# Patient Record
Sex: FEMALE | Race: WHITE | Employment: OTHER | URBAN - METROPOLITAN AREA
[De-identification: names, ages, dates, MRNs, and addresses within clinical notes are randomized per-mention and may not be internally consistent; named-entity substitution may affect disease eponyms.]

---

## 2017-07-14 ENCOUNTER — OFFICE VISIT (OUTPATIENT)
Dept: FAMILY MEDICINE | Facility: CLINIC | Age: 27
End: 2017-07-14
Payer: COMMERCIAL

## 2017-07-14 VITALS
BODY MASS INDEX: 53.62 KG/M2 | HEIGHT: 62 IN | TEMPERATURE: 99.3 F | RESPIRATION RATE: 25 BRPM | WEIGHT: 291.4 LBS | OXYGEN SATURATION: 99 % | DIASTOLIC BLOOD PRESSURE: 60 MMHG | HEART RATE: 103 BPM | SYSTOLIC BLOOD PRESSURE: 136 MMHG

## 2017-07-14 DIAGNOSIS — H66.001 ACUTE SUPPURATIVE OTITIS MEDIA OF RIGHT EAR WITHOUT SPONTANEOUS RUPTURE OF TYMPANIC MEMBRANE, RECURRENCE NOT SPECIFIED: Primary | ICD-10-CM

## 2017-07-14 PROCEDURE — 99213 OFFICE O/P EST LOW 20 MIN: CPT | Performed by: PHYSICIAN ASSISTANT

## 2017-07-14 RX ORDER — AZITHROMYCIN 250 MG/1
TABLET, FILM COATED ORAL
Qty: 6 TABLET | Refills: 0 | Status: SHIPPED | OUTPATIENT
Start: 2017-07-14 | End: 2018-01-15

## 2017-07-14 ASSESSMENT — PAIN SCALES - GENERAL: PAINLEVEL: MODERATE PAIN (4)

## 2017-07-14 NOTE — NURSING NOTE
"Chief Complaint   Patient presents with     Ear Problem     cough and vomitted 1/2 hr ago (sons are both ill )        Initial /60 (BP Location: Right arm, Patient Position: Chair, Cuff Size: Adult Large)  Pulse 103  Temp 99.3  F (37.4  C) (Oral)  Resp 25  Ht 5' 2\" (1.575 m)  Wt 291 lb 6.4 oz (132.2 kg)  SpO2 99%  BMI 53.3 kg/m2 Estimated body mass index is 53.3 kg/(m^2) as calculated from the following:    Height as of this encounter: 5' 2\" (1.575 m).    Weight as of this encounter: 291 lb 6.4 oz (132.2 kg).  Medication Reconciliation: complete   Frida Patton CMA      "

## 2017-07-14 NOTE — PROGRESS NOTES
"  SUBJECTIVE:  Keysha MILLAN is a 26 year old female who presents with the following concerns;              Symptoms: cc Present Absent Comment   Fever/Chills  x  Chills and achy    Fatigue  x     Muscle Aches  x     Eye Irritation  x  Weepy    Sneezing  x     Nasal Nicola/Drg  x     Sinus Pressure/Pain  x     Loss of smell  x     Dental pain   x    Sore Throat  x  slight   Swollen Glands   x Not sure   Ear Pain/Fullness  x  Both but Right is worse with painand crackling, son has b.l AOM   Cough  x     Wheeze   x    Chest Pain  x  With cough   Shortness of breath   x    Rash   x    Other  x  Vomiting x 1 yesterday, nausea with severe coughing     Symptom duration:  2 days   Sympom severity:  mod   Treatments tried:  dayquil and nyquil, tylenol    Contacts:  sons         No vertigo      No Known Allergies    No past medical history on file.      Current Outpatient Prescriptions on File Prior to Visit:  meclizine (ANTIVERT) 25 MG tablet Take 1 tablet (25 mg) by mouth every 4 hours as needed for dizziness (Patient not taking: Reported on 7/14/2017)   guaiFENesin-codeine (ROBITUSSIN AC) 100-10 MG/5ML SOLN Take 10 mLs by mouth every 6 hours as needed. (Patient not taking: Reported on 7/14/2017)   Norgestim-Eth Estrad Triphasic (TRI-SPRINTEC PO) Take  by mouth.     No current facility-administered medications on file prior to visit.     Social History   Substance Use Topics     Smoking status: Never Smoker     Smokeless tobacco: Not on file     Alcohol use Yes      Comment: [place]       ROS:  Consitutional: As above  ENT: As above  Respiratory: As above    OBJECTIVE:  /60 (BP Location: Right arm, Patient Position: Chair, Cuff Size: Adult Large)  Pulse 103  Temp 99.3  F (37.4  C) (Oral)  Resp 25  Ht 5' 2\" (1.575 m)  Wt 291 lb 6.4 oz (132.2 kg)  SpO2 99%  BMI 53.3 kg/m2  GENERAL APPEARANCE: healthy, alert and no distress  EYES: conjunctiva clear  HENT:  ,  Left TMsw/o erythema, effusion or bulging.rt TM red and " bulging.   Nose and mouth without ulcers, erythema or lesions.  NO tonsillar enlargement erythema or exudates.   NECK: supple, nontender, no lymphadenopathy  RESP: lungs clear to auscultation - no rales, rhonchi or wheezes  CV: regular rates and rhythm  abd soft nonttp, 1+ bowel sounds        ASSESSMENT: Well appearing.    ICD-10-CM    1. Acute suppurative otitis media of right ear without spontaneous rupture of tympanic membrane, recurrence not specified H66.001 azithromycin (ZITHROMAX Z-CHANCE) 250 MG tablet         PLAN:  Lots of rest and fluids.  RTC if any worsening symptoms or if not improving.    Tyshawn Bruce PA-C

## 2017-07-14 NOTE — PATIENT INSTRUCTIONS
Otitis Media (Middle-Ear Infection) in Adults  Otitis media is another name for a middle-ear infection. It means an infection behind your eardrum. This kind of ear infection can happen after any condition that keeps fluid from draining from the middle ear. These conditions include allergies, a cold, a sore throat, or a respiratory infection.  Middle-ear infections are common in children, but they can also happen in adults. An ear infection in an adult may mean a more serious problem than in a child. So you may need additional tests. If you have an ear infection, you should see your health care provider for treatment.  What are the types of middle-ear infections?  Infections can affect the middle ear in several ways. They are:    Acute otitis media. This middle-ear infection occurs suddenly. It causes swelling and redness. Fluid and mucus become trapped inside the ear. You can have a fever and ear pain.    Otitis media with effusion. Fluid (effusion) and mucus build up in the middle ear after the infection goes away. You may feel like your middle ear is full. This can continue for months and may affect your hearing.    Chronic otitis media with effusion. Fluid (effusion) remains in the middle ear for a long time. Or it builds up again and again, even though there is no infection. This type of middle-ear infection may be hard to treat. It may also affect your hearing.  Who is more likely to get a middle-ear infection?  You are more likely to get an ear infection if you:    Smoke or are around someone who smokes    Have seasonal or year-round allergy symptoms    Have a cold or other upper respiratory infection  What causes a middle-ear infection?  The middle ear connects to the throat by a canal called the eustachian tube. This tube helps even out the pressure between the outer ear and the inner ear. A cold or allergy can irritate the tube or cause the area around it to swell. This can keep fluid from draining from  the middle ear. The fluid builds up behind the eardrum. Bacteria and viruses can grow in this fluid. The bacteria and viruses cause the middle-ear infection.  What are the symptoms of a middle-ear infection?  Common symptoms of a middle-ear infection in adults are:    Pain in 1 or both ears    Drainage from the ear    Muffled hearing    Sore throat   You may also have a fever. Rarely, your balance can be affected.  These symptoms may be the same as for other conditions. It s important to talk with your health care provider if you think you have a middle-ear infection. If you have a high fever, severe pain behind your ear, or paralysis in your face, see your provider as soon as you can.  How is a middle-ear infection diagnosed?  Your health care provider will take a medical history and do a physical exam. He or she will look at the outer ear and eardrum with an otoscope. The otoscope is a lighted tool that lets your provider see inside the ear. A pneumatic otoscope blows a puff of air into the ear to check how well your eardrum moves. If you eardrum doesn t move well, it may mean you have fluid behind it.  Your provider may also do a test called tympanometry. This test tells how well the middle ear is working. It can find any changes in pressure in the middle ear. Your provider may test your hearing with a tuning fork.  How is a middle-ear infection treated?  A middle-ear infection may be treated with:    Antibiotics, taken by mouth or as ear drops    Medication for pain    Decongestants, antihistamines, or nasal steroids  Your health care provider may also have you try autoinsufflation. This helps adjust the air pressure in your ear. For this, you pinch your nose and gently exhale. This forces air back through the eustachian tube.  The exact treatment for your ear infection will depend on the type of infection you have. In general, if your symptoms don t get better in 48 to 72 hours, contact your health care  provider.  Middle-ear infections can cause long-term problems if not treated. They can lead to:    Infection in other parts of the head    Permanent hearing loss    Paralysis of a nerve in your face  If you have a middle-ear infection that doesn t get better, you may need to see an ear, nose, and throat specialist (otolaryngologist). You may need a CT scan or MRI to check for head and neck cancer.  Ear tubes  Sometimes fluid stays in the middle ear even after you take antibiotics and the infection goes away. In this case, your health care provider may suggest that a small tube be placed in your ear. The tube is put at the opening of the eardrum. The tube keeps fluid from building up and relieves pressure in the middle ear. It can also help you hear better. This surgery is called myringotomy. It is not often done in adults.  The tubes usually fall out on their own after 6 months to a year.    0959-5485 [employer]. [address] [place] [address], [place] [address]. All rights reserved. This information is not intended as a substitute for professional medical care. Always follow your healthcare professional's instructions.

## 2017-07-14 NOTE — MR AVS SNAPSHOT
After Visit Summary   7/14/2017    Keysha MILLAN    MRN: [id number]           Patient Information     Date Of Birth          1990        Visit Information        Provider Department      7/14/2017 1:20 PM Byron Bruce PA [facility]        Today's Diagnoses     Acute suppurative otitis media of right ear without spontaneous rupture of tympanic membrane, recurrence not specified    -  1      Care Instructions      Otitis Media (Middle-Ear Infection) in Adults  Otitis media is another name for a middle-ear infection. It means an infection behind your eardrum. This kind of ear infection can happen after any condition that keeps fluid from draining from the middle ear. These conditions include allergies, a cold, a sore throat, or a respiratory infection.  Middle-ear infections are common in children, but they can also happen in adults. An ear infection in an adult may mean a more serious problem than in a child. So you may need additional tests. If you have an ear infection, you should see your health care provider for treatment.  What are the types of middle-ear infections?  Infections can affect the middle ear in several ways. They are:    Acute otitis media. This middle-ear infection occurs suddenly. It causes swelling and redness. Fluid and mucus become trapped inside the ear. You can have a fever and ear pain.    Otitis media with effusion. Fluid (effusion) and mucus build up in the middle ear after the infection goes away. You may feel like your middle ear is full. This can continue for months and may affect your hearing.    Chronic otitis media with effusion. Fluid (effusion) remains in the middle ear for a long time. Or it builds up again and again, even though there is no infection. This type of middle-ear infection may be hard to treat. It may also affect your hearing.  Who is more likely to get a middle-ear infection?  You are more likely to get an ear  infection if you:    Smoke or are around someone who smokes    Have seasonal or year-round allergy symptoms    Have a cold or other upper respiratory infection  What causes a middle-ear infection?  The middle ear connects to the throat by a canal called the eustachian tube. This tube helps even out the pressure between the outer ear and the inner ear. A cold or allergy can irritate the tube or cause the area around it to swell. This can keep fluid from draining from the middle ear. The fluid builds up behind the eardrum. Bacteria and viruses can grow in this fluid. The bacteria and viruses cause the middle-ear infection.  What are the symptoms of a middle-ear infection?  Common symptoms of a middle-ear infection in adults are:    Pain in 1 or both ears    Drainage from the ear    Muffled hearing    Sore throat   You may also have a fever. Rarely, your balance can be affected.  These symptoms may be the same as for other conditions. It s important to talk with your health care provider if you think you have a middle-ear infection. If you have a high fever, severe pain behind your ear, or paralysis in your face, see your provider as soon as you can.  How is a middle-ear infection diagnosed?  Your health care provider will take a medical history and do a physical exam. He or she will look at the outer ear and eardrum with an otoscope. The otoscope is a lighted tool that lets your provider see inside the ear. A pneumatic otoscope blows a puff of air into the ear to check how well your eardrum moves. If you eardrum doesn t move well, it may mean you have fluid behind it.  Your provider may also do a test called tympanometry. This test tells how well the middle ear is working. It can find any changes in pressure in the middle ear. Your provider may test your hearing with a tuning fork.  How is a middle-ear infection treated?  A middle-ear infection may be treated with:    Antibiotics, taken by mouth or as ear  drops    Medication for pain    Decongestants, antihistamines, or nasal steroids  Your health care provider may also have you try autoinsufflation. This helps adjust the air pressure in your ear. For this, you pinch your nose and gently exhale. This forces air back through the eustachian tube.  The exact treatment for your ear infection will depend on the type of infection you have. In general, if your symptoms don t get better in 48 to 72 hours, contact your health care provider.  Middle-ear infections can cause long-term problems if not treated. They can lead to:    Infection in other parts of the head    Permanent hearing loss    Paralysis of a nerve in your face  If you have a middle-ear infection that doesn t get better, you may need to see an ear, nose, and throat specialist (otolaryngologist). You may need a CT scan or MRI to check for head and neck cancer.  Ear tubes  Sometimes fluid stays in the middle ear even after you take antibiotics and the infection goes away. In this case, your health care provider may suggest that a small tube be placed in your ear. The tube is put at the opening of the eardrum. The tube keeps fluid from building up and relieves pressure in the middle ear. It can also help you hear better. This surgery is called myringotomy. It is not often done in adults.  The tubes usually fall out on their own after 6 months to a year.    [id number] [employer]. [address] [place] [address]. All rights reserved. This information is not intended as a substitute for professional medical care. Always follow your healthcare professional's instructions.                Follow-ups after your visit        Follow-up notes from your care team     Return if symptoms worsen or fail to improve.      Who to contact     If you have questions or need follow up information about today's clinic visit or your schedule please contact [facility] LIVIA CRAVEN directly at  "[phone].  Normal or non-critical lab and imaging results will be communicated to you by ValuNethart, letter or phone within 4 business days after the clinic has received the results. If you do not hear from us within 7 days, please contact the clinic through [employer]uNethart or phone. If you have a critical or abnormal lab result, we will notify you by phone as soon as possible.  Submit refill requests through Hubblr or call your pharmacy and they will forward the refill request to us. Please allow 3 business days for your refill to be completed.          Additional Information About Your Visit        ValuNetharICEdot Information     Hubblr lets you send messages to your doctor, view your test results, renew your prescriptions, schedule appointments and more. To sign up, go to [email or web address] . Click on \"Log in\" on the left side of the screen, which will take you to the Welcome page. Then click on \"Sign up Now\" on the right side of the page.     You will be asked to enter the access code listed below, as well as some personal information. Please follow the directions to create your username and password.     Your access code is: [email or web address]  Expires: 10/12/2017  1:32 PM     Your access code will  in 90 days. If you need help or a new code, please call your [facility] clinic or [phone].        Care EveryWhere ID     This is your Care EveryWhere ID. This could be used by other organizations to access your [facility] medical records  [id number]        Your Vitals Were     Pulse Temperature Respirations Height Pulse Oximetry BMI (Body Mass Index)    103 99.3  F (37.4  C) (Oral) 25 5' 2\" (1.575 m) 99% 53.3 kg/m2       Blood Pressure from Last 3 Encounters:   17 136/60   16 135/75   10/04/12 154/96    Weight from Last 3 Encounters:   17 291 lb 6.4 oz (132.2 kg)   16 260 lb (117.9 kg)   10/04/12 263 lb (119.3 kg)              Today, you had the following     No orders found for display       "   Today's Medication Changes          These changes are accurate as of: 7/14/17  1:32 PM.  If you have any questions, ask your nurse or doctor.               Start taking these medicines.        Dose/Directions    azithromycin 250 MG tablet   Commonly known as:  ZITHROMAX Z-CHANCE   Used for:  Acute suppurative otitis media of right ear without spontaneous rupture of tympanic membrane, recurrence not specified   Started by:  Byron Bruce PA        2 tabs day one then 1 tab qd   Quantity:  6 tablet   Refills:  0            Where to get your medicines      These medications were sent to [id number] - [place], [place] - [address]  [address] [address]     Phone:  [phone]     azithromycin 250 MG tablet                Primary Care Provider    Physician No Ref-Primary       No address on file        Equal Access to Services     CHHAYA RAINES : Jd lyleo Somiguel, waaxda luqadaha, qaybta kaalmada adeegyada, dar torres. So [facility] [phone].    ATENCIÓN: Si habla español, tiene a hughes disposición servicios gratuitos de asistencia lingüística. Llame al [phone].    We comply with applicable federal civil rights laws and [place] laws. We do not discriminate on the basis of race, color, national origin, age, disability sex, sexual orientation or gender identity.            Thank you!     Thank you for choosing [facility]  for your care. Our goal is always to provide you with excellent care. Hearing back from our patients is one way we can continue to improve our services. Please take a few minutes to complete the written survey that you may receive in the mail after your visit with us. Thank you!             Your Updated Medication List - Protect others around you: Learn how to safely use, store and throw away your medicines at www.disposemymeds.org.          This list is accurate as of: 7/14/17  1:32 PM.   Always use your most recent med list.                   Brand Name Dispense Instructions for use Diagnosis    azithromycin 250 MG tablet    ZITHROMAX Z-CHANCE    6 tablet    2 tabs day one then 1 tab qd    Acute suppurative otitis media of right ear without spontaneous rupture of tympanic membrane, recurrence not specified       guaiFENesin-codeine 100-10 MG/5ML Soln solution    ROBITUSSIN AC    200 mL    Take 10 mLs by mouth every 6 hours as needed.    Acute pharyngitis       meclizine 25 MG tablet    ANTIVERT    30 tablet    Take 1 tablet (25 mg) by mouth every 4 hours as needed for dizziness        TRI-SPRINTEC PO      Take  by mouth.

## 2017-08-14 ENCOUNTER — TELEPHONE (OUTPATIENT)
Dept: FAMILY MEDICINE | Facility: CLINIC | Age: 27
End: 2017-08-14

## 2017-08-14 NOTE — TELEPHONE ENCOUNTER
Panel Management Review      BP Readings from Last 1 Encounters:   07/14/17 136/60    , No results found for: A1C, 7/14/2017    Fail List measure: Physical with pap.      Patient is due/failing the following:   PAP and PHYSICAL    Action needed:   Patient needs office visit for Physical with pap..    Type of outreach:    Phone, left message for patient to call back.  and Sent letter.    Questions for provider review:    None                                                                                                                                    Luz Elena Arteaga CMA

## 2017-08-14 NOTE — LETTER
August 14, 2017      Keysha MILLAN  [address] [address]        Dear Keysha MILLAN, [id number]    At [facility] we care about your health and are committed to providing quality patient care, which includes staying current on preventative cancer screenings.  You can increase your chances of finding and treating cancers through regular screenings.      Our records show that you are due for the following screening(s): Physical with pap.    Pap Smear for cervical cancer  Recommended every three years for women 21 and older  A Pap test is used to detect cervical cancer.  The test should be taken at least once every three years but women who are at a greater risk for cervical cancer may need to have the test more often.      You are at a greater risk for cervical cancer if:   - You have had a sexually transmitted disease   - You have had more than one sex partner   - You have had an abnormal pap test in the past    You may contact the [facility] at [phone] to schedule the screening test(s) at your earliest convenience.    If you have a My-Chart Account, you also can schedule this appointment through there.    If you have already had one or all of the above screening tests at another facility, please call us so that we may update your chart.      Your partners in health,      Quality Committee   [facility]

## 2017-09-18 ENCOUNTER — OFFICE VISIT (OUTPATIENT)
Dept: URGENT CARE | Facility: URGENT CARE | Age: 27
End: 2017-09-18
Payer: COMMERCIAL

## 2017-09-18 VITALS
DIASTOLIC BLOOD PRESSURE: 93 MMHG | BODY MASS INDEX: 54.03 KG/M2 | HEART RATE: 114 BPM | WEIGHT: 293 LBS | SYSTOLIC BLOOD PRESSURE: 139 MMHG | TEMPERATURE: 98.9 F | OXYGEN SATURATION: 96 %

## 2017-09-18 DIAGNOSIS — R07.0 THROAT PAIN: Primary | ICD-10-CM

## 2017-09-18 DIAGNOSIS — D72.829 LEUKOCYTOSIS, UNSPECIFIED TYPE: ICD-10-CM

## 2017-09-18 LAB
BASOPHILS # BLD AUTO: 0.1 10E9/L (ref 0–0.2)
BASOPHILS NFR BLD AUTO: 0.4 %
DEPRECATED S PYO AG THROAT QL EIA: NORMAL
DIFFERENTIAL METHOD BLD: ABNORMAL
EOSINOPHIL # BLD AUTO: 0.3 10E9/L (ref 0–0.7)
EOSINOPHIL NFR BLD AUTO: 2.1 %
ERYTHROCYTE [DISTWIDTH] IN BLOOD BY AUTOMATED COUNT: 17.4 % (ref 10–15)
HCT VFR BLD AUTO: 38.3 % (ref 35–47)
HETEROPH AB SER QL: NEGATIVE
HGB BLD-MCNC: 12.3 G/DL (ref 11.7–15.7)
LYMPHOCYTES # BLD AUTO: 2.8 10E9/L (ref 0.8–5.3)
LYMPHOCYTES NFR BLD AUTO: 23.5 %
MCH RBC QN AUTO: 25.5 PG (ref 26.5–33)
MCHC RBC AUTO-ENTMCNC: 32.1 G/DL (ref 31.5–36.5)
MCV RBC AUTO: 80 FL (ref 78–100)
MONOCYTES # BLD AUTO: 0.7 10E9/L (ref 0–1.3)
MONOCYTES NFR BLD AUTO: 6.1 %
NEUTROPHILS # BLD AUTO: 8 10E9/L (ref 1.6–8.3)
NEUTROPHILS NFR BLD AUTO: 67.9 %
PLATELET # BLD AUTO: 425 10E9/L (ref 150–450)
RBC # BLD AUTO: 4.82 10E12/L (ref 3.8–5.2)
SPECIMEN SOURCE: NORMAL
WBC # BLD AUTO: 11.8 10E9/L (ref 4–11)

## 2017-09-18 PROCEDURE — 87880 STREP A ASSAY W/OPTIC: CPT | Performed by: PHYSICIAN ASSISTANT

## 2017-09-18 PROCEDURE — 99213 OFFICE O/P EST LOW 20 MIN: CPT | Performed by: PHYSICIAN ASSISTANT

## 2017-09-18 PROCEDURE — 86308 HETEROPHILE ANTIBODY SCREEN: CPT | Performed by: PHYSICIAN ASSISTANT

## 2017-09-18 PROCEDURE — 36415 COLL VENOUS BLD VENIPUNCTURE: CPT | Performed by: PHYSICIAN ASSISTANT

## 2017-09-18 PROCEDURE — 85025 COMPLETE CBC W/AUTO DIFF WBC: CPT | Performed by: PHYSICIAN ASSISTANT

## 2017-09-18 PROCEDURE — 87081 CULTURE SCREEN ONLY: CPT | Performed by: PHYSICIAN ASSISTANT

## 2017-09-18 RX ORDER — CEFUROXIME AXETIL 500 MG/1
500 TABLET ORAL 2 TIMES DAILY
Qty: 20 TABLET | Refills: 0 | Status: SHIPPED | OUTPATIENT
Start: 2017-09-18 | End: 2018-01-15

## 2017-09-18 NOTE — MR AVS SNAPSHOT
"              After Visit Summary   2017    Keysha MILLAN    MRN: [id number]           Patient Information     Date Of Birth          1990        Visit Information        Provider Department      2017 11:20 AM Yas Merritt PA-C [facility]        Today's Diagnoses     Throat pain    -  1    Leukocytosis, unspecified type           Follow-ups after your visit        Who to contact     If you have questions or need follow up information about today's clinic visit or your schedule please contact [facility] directly at [phone].  Normal or non-critical lab and imaging results will be communicated to you by Seenhart, letter or phone within 4 business days after the clinic has received the results. If you do not hear from us within 7 days, please contact the clinic through Seenhart or phone. If you have a critical or abnormal lab result, we will notify you by phone as soon as possible.  Submit refill requests through Corporate Times or call your pharmacy and they will forward the refill request to us. Please allow 3 business days for your refill to be completed.          Additional Information About Your Visit        MyChart Information     [employer]porate Times lets you send messages to your doctor, view your test results, renew your prescriptions, schedule appointments and more. To sign up, go to [email or web address] . Click on \"Log in\" on the left side of the screen, which will take you to the Welcome page. Then click on \"Sign up Now\" on the right side of the page.     You will be asked to enter the access code listed below, as well as some personal information. Please follow the directions to create your username and password.     Your access code is: [email or web address]  Expires: 10/12/2017  1:32 PM     Your access code will  in 90 days. If you need help or a new code, please call your [facility] or [phone].        Care EveryWhere ID     This is your Care EveryWhere " ID. This could be used by other organizations to access your [place] medical records  [id number]        Your Vitals Were     Pulse Temperature Pulse Oximetry BMI (Body Mass Index)          114 98.9  F (37.2  C) (Oral) 96% 54.03 kg/m2         Blood Pressure from Last 3 Encounters:   09/18/17 (!) 139/93   07/14/17 136/60   09/19/16 135/75    Weight from Last 3 Encounters:   09/18/17 295 lb 6.4 oz (134 kg)   07/14/17 291 lb 6.4 oz (132.2 kg)   09/19/16 260 lb (117.9 kg)              We Performed the Following     Beta strep group A culture     CBC with platelets differential     Mononucleosis screen     Strep, Rapid Screen          Today's Medication Changes          These changes are accurate as of: 9/18/17  1:36 PM.  If you have any questions, ask your nurse or doctor.               Start taking these medicines.        Dose/Directions    cefuroxime 500 MG tablet   Commonly known as:  CEFTIN   Used for:  Throat pain, Leukocytosis, unspecified type   Started by:  Yas Merritt PA-C        Dose:  500 mg   Take 1 tablet (500 mg) by mouth 2 times daily   Quantity:  20 tablet   Refills:  0            Where to get your medicines      These medications were sent to [id number] [address] [address]     Phone:  [phone]     cefuroxime 500 MG tablet                Primary Care Provider    Physician No Ref-Primary       No address on file        Equal Access to Services     CHHAYA RAINES AH: Jd Alarcon, deon cuevas, qachito kaalmadar molina. So [facility] [phone].    ATENCIÓN: Si habla español, tiene a hughes disposición servicios gratuitos de asistencia lingüística. Llame al [phone].    We comply with applicable federal civil rights laws and [place] laws. We do not discriminate on the basis of race, color, national origin, age, disability sex, sexual orientation or gender  identity.            Thank you!     Thank you for choosing [facility]  for your care. Our goal is always to provide you with excellent care. Hearing back from our patients is one way we can continue to improve our services. Please take a few minutes to complete the written survey that you may receive in the mail after your visit with us. Thank you!             Your Updated Medication List - Protect others around you: Learn how to safely use, store and throw away your medicines at www.disposemymeds.org.          This list is accurate as of: 9/18/17  1:36 PM.  Always use your most recent med list.                   Brand Name Dispense Instructions for use Diagnosis    azithromycin 250 MG tablet    ZITHROMAX Z-CHANCE    6 tablet    2 tabs day one then 1 tab qd    Acute suppurative otitis media of right ear without spontaneous rupture of tympanic membrane, recurrence not specified       cefuroxime 500 MG tablet    CEFTIN    20 tablet    Take 1 tablet (500 mg) by mouth 2 times daily    Throat pain, Leukocytosis, unspecified type       guaiFENesin-codeine 100-10 MG/5ML Soln solution    ROBITUSSIN AC    200 mL    Take 10 mLs by mouth every 6 hours as needed.    Acute pharyngitis       meclizine 25 MG tablet    ANTIVERT    30 tablet    Take 1 tablet (25 mg) by mouth every 4 hours as needed for dizziness        TRI-SPRINTEC PO      Take  by mouth.

## 2017-09-18 NOTE — NURSING NOTE
"Chief Complaint   Patient presents with     Pharyngitis       Initial BP (!) 139/93 (BP Location: Left arm, Patient Position: Chair, Cuff Size: Adult Regular)  Pulse 114  Temp 98.9  F (37.2  C) (Oral)  Wt 295 lb 6.4 oz (134 kg)  SpO2 96%  BMI 54.03 kg/m2 Estimated body mass index is 54.03 kg/(m^2) as calculated from the following:    Height as of 7/14/17: 5' 2\" (1.575 m).    Weight as of this encounter: 295 lb 6.4 oz (134 kg).  Medication Reconciliation: complete       Kim Barnes    "

## 2017-09-18 NOTE — PROGRESS NOTES
SUBJECTIVE:   Keysha MILLAN is a 26 year old female who presents to clinic today for the following health issues:      Sore throat      Duration: 2 weeks    Description (location/character/radiation): throat    Intensity:  moderate    Accompanying signs and symptoms: left ear pain    History (similar episodes/previous evaluation): None    Precipitating or alleviating factors: None    Therapies tried and outcome: over the counter medicine     ST worse last couple of days. No reflux symptoms    No Known Allergies    No past medical history on file.      Current Outpatient Prescriptions on File Prior to Visit:  azithromycin (ZITHROMAX Z-CHANCE) 250 MG tablet 2 tabs day one then 1 tab qd   meclizine (ANTIVERT) 25 MG tablet Take 1 tablet (25 mg) by mouth every 4 hours as needed for dizziness (Patient not taking: Reported on 7/14/2017)   guaiFENesin-codeine (ROBITUSSIN AC) 100-10 MG/5ML SOLN Take 10 mLs by mouth every 6 hours as needed. (Patient not taking: Reported on 7/14/2017)   Norgestim-Eth Estrad Triphasic (TRI-SPRINTEC PO) Take  by mouth.     No current facility-administered medications on file prior to visit.     Social History   Substance Use Topics     Smoking status: Never Smoker     Smokeless tobacco: Not on file     Alcohol use Yes      Comment: [place]       ROS:  Consitutional: As above  ENT: As above  Respiratory: As above    OBJECTIVE:  BP (!) 139/93 (BP Location: Left arm, Patient Position: Chair, Cuff Size: Adult Regular)  Pulse 114  Temp 98.9  F (37.2  C) (Oral)  Wt 295 lb 6.4 oz (134 kg)  SpO2 96%  BMI 54.03 kg/m2  GENERAL APPEARANCE: healthy, alert and no distress  EYES: conjunctiva clear  EARS: No cerumen.   Ear canals w/o erythema, TM's intact w/o erythema.    NOSE/MOUTH: Nose and mouth without ulcers, erythema or lesions  SINUSES: No maxillary sinus tenderness.  THROAT: Moderate erythema w/o tonsillar enlargement . No exudates  NECK: supple, nontender, no lymphadenopathy  RESP: lungs clear to  auscultation - no rales, rhonchi or wheezes  CV: regular rates and rhythm, normal S1 S2, no murmur noted  NEURO: awake, alert    Results for orders placed or performed in visit on 09/18/17   Mononucleosis screen   Result Value Ref Range    Mononucleosis Screen Negative NEG^Negative   CBC with platelets differential   Result Value Ref Range    WBC 11.8 (H) 4.0 - 11.0 10e9/L    RBC Count 4.82 3.8 - 5.2 10e12/L    Hemoglobin 12.3 11.7 - 15.7 g/dL    Hematocrit 38.3 35.0 - 47.0 %    MCV 80 78 - 100 fl    MCH 25.5 (L) 26.5 - 33.0 pg    MCHC 32.1 31.5 - 36.5 g/dL    RDW 17.4 (H) 10.0 - 15.0 %    Platelet Count 425 150 - 450 10e9/L    Diff Method Automated Method     % Neutrophils 67.9 %    % Lymphocytes 23.5 %    % Monocytes 6.1 %    % Eosinophils 2.1 %    % Basophils 0.4 %    Absolute Neutrophil 8.0 1.6 - 8.3 10e9/L    Absolute Lymphocytes 2.8 0.8 - 5.3 10e9/L    Absolute Monocytes 0.7 0.0 - 1.3 10e9/L    Absolute Eosinophils 0.3 0.0 - 0.7 10e9/L    Absolute Basophils 0.1 0.0 - 0.2 10e9/L   Strep, Rapid Screen   Result Value Ref Range    Specimen Description Throat     Rapid Strep A Screen       NEGATIVE: No Group A streptococcal antigen detected by immunoassay, await culture report.         ASSESSMENT: Well appearing.    ICD-10-CM    1. Throat pain R07.0 Strep, Rapid Screen     Beta strep group A culture     Mononucleosis screen     CBC with platelets differential     cefuroxime (CEFTIN) 500 MG tablet   2. Leukocytosis, unspecified type D72.829 cefuroxime (CEFTIN) 500 MG tablet       PLAN:  Lots of rest and fluids.  RTC if any worsening symptoms or if not improving.    Yas Merritt PA-C

## 2017-09-19 LAB
BACTERIA SPEC CULT: NORMAL
SPECIMEN SOURCE: NORMAL

## 2017-11-27 ENCOUNTER — TELEPHONE (OUTPATIENT)
Dept: FAMILY MEDICINE | Facility: CLINIC | Age: 27
End: 2017-11-27

## 2017-11-27 NOTE — LETTER
November 27, 2017      Keysha MILLAN  [address] [address]          Dear Keysha MILLAN, [id number]    At [facility] we care about your health and are committed to providing quality patient care, which includes staying current on preventative cancer screenings.  You can increase your chances of finding and treating cancers through regular screenings.      Our records show that you are due for the following screening(s): Physical with pap.  Pap Smear for cervical cancer  Recommended every three years for women 21 and older  A Pap test is used to detect cervical cancer.  The test should be taken at least once every three years but women who are at a greater risk for cervical cancer may need to have the test more often.      You are at a greater risk for cervical cancer if:   - You have had a sexually transmitted disease   - You have had more than one sex partner   - You have had an abnormal pap test in the past    You may contact the [facility] at [phone] to schedule the screening test(s) at your earliest convenience.    If you have a My-Chart Account, you also can schedule this appointment through there.    If you have already had one or all of the above screening tests at another facility, please call us so that we may update your chart.      Your partners in health,      Quality Committee   [facility]/an

## 2017-11-27 NOTE — TELEPHONE ENCOUNTER
Panel Management Review      BP Readings from Last 1 Encounters:   09/18/17 (!) 139/93    , No results found for: A1C, 8/14/2017  Last Office Visit with this department: 8/14/2017    Fail List measure: Physical with pap.      Patient is due/failing the following:   PAP and PHYSICAL    Action needed:   Patient needs office visit for Physical with pap..    Type of outreach:    Phone, left message for patient to call back.  and Sent letter.    Questions for provider review:    None                                                                                                                                    Luz Elena Arteaga CMA

## 2017-11-29 ENCOUNTER — TRANSFERRED RECORDS (OUTPATIENT)
Dept: HEALTH INFORMATION MANAGEMENT | Facility: CLINIC | Age: 27
End: 2017-11-29

## 2017-11-29 LAB
HBA1C MFR BLD: 5.1 % (ref 4–5.6)
PAP-ABSTRACT: NORMAL

## 2018-01-15 ENCOUNTER — PRENATAL OFFICE VISIT (OUTPATIENT)
Dept: NURSING | Facility: CLINIC | Age: 28
End: 2018-01-15
Payer: COMMERCIAL

## 2018-01-15 ENCOUNTER — PRENATAL OFFICE VISIT (OUTPATIENT)
Dept: MIDWIFE SERVICES | Facility: CLINIC | Age: 28
End: 2018-01-15
Payer: COMMERCIAL

## 2018-01-15 VITALS
DIASTOLIC BLOOD PRESSURE: 80 MMHG | BODY MASS INDEX: 52.85 KG/M2 | HEIGHT: 62 IN | SYSTOLIC BLOOD PRESSURE: 128 MMHG | TEMPERATURE: 98 F | WEIGHT: 287.2 LBS | HEART RATE: 84 BPM

## 2018-01-15 DIAGNOSIS — Z34.90 SUPERVISION OF NORMAL PREGNANCY: Primary | ICD-10-CM

## 2018-01-15 DIAGNOSIS — E66.01 MORBID OBESITY (H): ICD-10-CM

## 2018-01-15 DIAGNOSIS — Z34.82 ENCOUNTER FOR SUPERVISION OF OTHER NORMAL PREGNANCY IN SECOND TRIMESTER: Primary | ICD-10-CM

## 2018-01-15 PROBLEM — O10.919 CHRONIC HYPERTENSION AFFECTING PREGNANCY: Status: RESOLVED | Noted: 2017-12-20 | Resolved: 2018-01-15

## 2018-01-15 PROBLEM — Z23 NEED FOR TDAP VACCINATION: Status: ACTIVE | Noted: 2018-01-15

## 2018-01-15 PROBLEM — O09.90 HIGH RISK PREGNANCY, ANTEPARTUM: Status: RESOLVED | Noted: 2017-11-29 | Resolved: 2018-01-15

## 2018-01-15 PROBLEM — O09.90 HIGH RISK PREGNANCY, ANTEPARTUM: Status: ACTIVE | Noted: 2017-11-29

## 2018-01-15 PROBLEM — O99.210 OBESITY AFFECTING PREGNANCY, ANTEPARTUM: Status: ACTIVE | Noted: 2017-11-29

## 2018-01-15 PROBLEM — G43.909 MIGRAINE: Status: ACTIVE | Noted: 2018-01-15

## 2018-01-15 PROBLEM — O10.919 CHRONIC HYPERTENSION AFFECTING PREGNANCY: Status: ACTIVE | Noted: 2017-12-20

## 2018-01-15 PROCEDURE — 99207 ZZC PRENATAL VISIT: CPT | Performed by: ADVANCED PRACTICE MIDWIFE

## 2018-01-15 PROCEDURE — 99207 ZZC NO CHARGE NURSE ONLY: CPT

## 2018-01-15 RX ORDER — PNV NO.95/FERROUS FUM/FOLIC AC 28MG-0.8MG
TABLET ORAL
COMMUNITY
End: 2019-08-02

## 2018-01-15 NOTE — PROGRESS NOTES
Patient presents for new ob transfer. declined genetic testing. Handouts reviewed and given. Has appointment with CNM today .  Blood pressure needs to be taken manually, she has history of preeclampsia first pregnancy. She has been keeping a log daily and numbers have been WNL.     Prenatal OB Questionnaire  Past Medical History  Diabetes   No  Hypertension   No  Heart Disease, mitral valve prolapse, or rheumatic fever?   No  An autoimmune disorder such as Lupus or Rheumatoid Arthritis?   No  Kidney Disease or Urinary Tract Infection?   No  Epilepsy, seizures or spells?   No  Migraine headaches?   No  A stroke or loss of function or sensation?   No  Any other neurological problems?   No  Have you ever been treated for depression?  No  Are you having problems with crying spells or loss of self-esteem?   No  Have you ever required psychiatric care?   No  Have you ever hepatitis, liver disease or jaundice?   No  Have you ever been treated for blood clots in your veins, deep venous thrombosis, inflammation in the veins, thrombosis, phlebitis, pulmonary embolism or varicosities?   No  Have you had excessive bleeding after surgery or dental work?   No  Do you bleed more than other women after a cut or scratch?   No  Do you have a history of anemia?   No  Have you ever been treated for thyroid problems or taken thyroid medication?  No  Do you have any other endocrine problems?  No  Have you ever been in a major accident or suffered serious trauma?   No  Within the last year, has anyone hit slapped, kicked or otherwise hurt you?  No  In the last year, has anyone forced you to have sex when you didn't want to?  No  Have you ever had a blood transfusion?   No  Would you refuse a blood transfusion if a doctor judged it to be medically necessary?   No  If you answered yes, would you rather die than have a blood transfusion?   No  If you answered yes, is this for Oriental orthodox reasons?   No  Does anyone in your home smoke?   No  Do  you use tobacco products?  No  Do you drink beer, wine, hard liquor?  No  Do you use any of the following: marijuana, speed, cocaine, heroine, hallucinogens, or other drugs?  No  Is your blood type Rh negative?   No  Have you ever had abnormal antibodies in your blood?   No  Have you ever had asthma?   In the past  Have you ever had tuberculosis?   No  Do you have any allergies to drugs or over-the-counter medications?   No    Allergies as of 1/15/2018:    Allergies as of 01/15/2018     (No Known Allergies)       Do you have any breast problems?   No  Have you ever ?  Yes  Have you had any gynecological surgical procedures such as cervical conization, a LEEP procedure, laser treatment, cryosurgery of the cervix, or a dilation and curettage, etc?  No  Have you had any other surgical procedures?  No  Have you been hospitalized for a nonsurgical reason excluding normal delivery?   No  Have you ever had any anesthetic complications?   No  Have you ever had an abnormal pap smear?   No  Do you have a history of abnormalities of the uterus?   No  Did it take you more than one year to become pregnant?   No  Have you ever been evaluated or treated for infertility?   Yes  Is there a history of medical problems in your family, which you feel might adversely affect your health or pregnancy?   No  Do you have any other problems we have not asked you about which you feel may be important to this pregnancy?  No    Symptoms since Last Menstrual Period  Do you have any of the following:    *abdominal pain  No  *blood in stool or urine  No  *chest pain  No  *shortness of breath  No  *coughing or vomiting up blood No  *heart racing or skipping beats  No  *nausea and vomiting  Yes  *pain with urination  No  *vaginal discharge or bleeding  No  Current medications are:  Current Outpatient Prescriptions   Medication Sig Dispense Refill     Prenatal Vit-Fe Fumarate-FA (PRENATAL VITAMINS) 28-0.8 MG TABS 1 Tab daily. chewables          Genetic Screening  At the time of birth, will you be 35 years old or older?  No  Has the patient, baby s father, or anyone in either family had:  Thalassemia (Italian, Greek, Mediterranean, or  background only) and an MCV result less than 80?  No  Neural tube defect such as meningomyelocele, spina bifida or anencephaly?  No  Congenital heart defect?  No  Down s syndrome?  No  Adam-Sach s disease (Islam, Cajun, Frisian-[place])?  No  Sickle cell disease or trait (Mimi)?  No  Hemophilia or other inherited problems of blood coagulation? No  Muscular dystrophy?  No  Cystic Fibrosis?  No  Niharika s chorea?  No  Mental retardation/autism? No   If yes, was the person tested for fragile X?  No  Any other inherited genetic or chromosomal disorder?  No  Maternal metabolic disorder (e.g. insulin-dependent diabetes, PKU)? No  A child with birth defects not listed above?  No  Recurrent pregnancy loss or a stillbirth?  No  Has the patient had any medications/street drugs/alcohol since her last menstrual period? No  Does the patient or baby s father have any other genetic risks?  No  Infection History  Do you object to being tested for Hepatitis B? No  Do you object to being tested for HIV? No  Do you feel that you are at high risk for coming in contact with the AIDS virus?  No  Have you ever been treated for tuberculosis?  No  Have you ever received the BCG vaccine for tuberculosis?  No  Have you ever had a positive skin test for tuberculosis? No  Do you live with someone who has tuberculosis?  No  Have you ever been exposed to tuberculosis?  No  Do you have genital herpes?  No  Does your partner have genital herpes?  No  Have you had a rash or viral illness since your last period?  No  Have you ever had Gonorrhea, Chlamydia, Syphilis, venereal warts, trichomoniasis, pelvic inflammatory disease or any other sexually transmitted disease?  No  Do you know if you are a genital group B streptococcus carrier? No  You  have not had chicken pox/varicella  Yes  Have you been vaccinated against chicken pox?  Yes  Have you had any other infectious disease? No        Early ultrasound screening tool:    Does patient have irregular periods?  No  Did patient use hormonal birth control in the three months prior to positive urine pregnancy test? No  Is the patient breastfeeding?  No  Is the patient 10 weeks or greater at time of education visit?  Yes

## 2018-01-15 NOTE — Clinical Note
Please abstract the following data from this visit with this patient into the appropriate field in Epic:  Pap smear done on this date: 11/2017 (approximately), by this group: [facility], results were Negative.

## 2018-01-15 NOTE — PROGRESS NOTES
16w2d  [facility] Patient stating she assumed care at  and reports normal New OB Prenatal vist panel. GC/Camilaam neg. Pap in . First trimester screening with normal results reported by patient. Wants to transfer care from [facility] r/t one elevated BP that the patient states was taken with an ill fitting BP cuff. OB there stated that r/t that one elevated BP she would need an IOL at 37 wks or a repeat c/s. Today her BP is normal. She reports that she has been taking BP at home daily and all normal.  is EMT. She has a Hx of IOL for gestational HTN with first pregnancy with primary  for arrest of dilation at 6cm. After she delivered her BP was severe and she was treated with mag so. With her second pregnancy she took a baby aspirnin and remained normotensive throughout. She had a  at 41wks with a 8lb 14oz baby. She is currently taking a baby aspirin. She would very much like another . We discussed that as long as her BP remains normal we can offer her expectant management and [place].   Fetal survey ultrasound ordered at [facility] r/t maternal monika Chopra CARO is a 27 year old female, ,     Pt presents to clinic today for a NOB visit.  Patient's last menstrual period was 2017.. Estimated Date of Delivery: 2018 is calculated from lmp and verified with U/S     She has not had bleeding since her LMP.   She has not had nausea. Weight loss has not occurred. - typically loses weight with pregnancies.   This was a planned pregnancy.   FOB is involved,  Eb     OTHER CONCERNS:     Pt's hx of HSV is negative    ============================================  PERSONAL/SOCIAL HISTORY  Lives lives with their family.  Employment: Full time.  Her job involves light activity .  Exercises no regular exercise program  Pt denies abuse and feels safe in her home and relationships.     REVIEW OF SYSTEMS  C: NEGATIVE for fever, chills, change in  weight  I: NEGATIVE for worrisome rashes, moles or lesions  E/M: NEGATIVE for ear, mouth and throat problems  R: NEGATIVE for significant cough or SOB  CV: NEGATIVE for chest pain, palpitations or peripheral edema  GI: NEGATIVE for nausea, abdominal pain, heartburn, or change in bowel habits  : NEGATIVE for frequency, dysuria, or hematuria  PSYCHIATRIC:  NEGATIVE for depression, anxiety or other mental health concerns    PHYSICAL EXAM:  LMP 2017  BMI- There is no height or weight on file to calculate BMI., RECOMMENDED WEIGHT GAIN: < 15 lbs.  GENERAL:  Pleasant pregnant female, alert, cooperative and well groomed.  SKIN:  Warm and dry, without lesions or rashes  HEAD: Symmetrical features.  MOUTH:  Buccal mucosa pink, moist without lesions.  Teeth in good repair.    NECK:  Thyroid without enlargement and nodules.  Lymph nodes not palpable.   LUNGS:  Clear to auscultation.      HEART:  RRR without murmur.  ABDOMEN: Soft without masses , tenderness or organomegaly.  No CVA tenderness.  Uterus palpable at size equal to dates.  Well healed scar from  section.  MUSCULOSKELETAL:  Full range of motion  EXTREMITIES:  No edema. No significant varicosities.     PELvic Exam: deferred  RECTAL:  Normal appearance.  Digital exam deferred.    GC/CHLAMYDIA CULTURE OBTAINED:YES at [facility] with negative results.     ASSESSMENT:  Intrauterine pregnancy at 16w2d weeks gestation.     (E66.01) Morbid obesity (H)  (primary encounter diagnosis)  Comment:   Plan: [facility] REFERRAL-PREGNANCY            (Z34.82) Encounter for supervision of other normal pregnancy in second trimester  Comment:   Plan: [facility] REFERRAL-PREGNANCY              PLAN:  Oriented to CNM service.    Instructed on use of triage nurse line and contacting the on call CNM after hours for an urgent need such as fever, vagina bleeding, bladder or vaginal infection, rupture of membranes,  or term labor.      Discussed the  indications, uses for and false positives for quad screen  and fetal survey ultrasound at 18-20 weeks gestation. Will inform us at the next visit if she wished to avail herself of these screens.    Instructed on best evidence for: weight gain for her weight and height for pregnancy; healthy diet; exercise and activity during pregnancy; and maintenance of a generally healthy lifestyle.     Discussed the harms, benefits, side effects and alternative therapies for current prescribed and OTC medications.    Chanelle Sam CNM

## 2018-01-15 NOTE — MR AVS SNAPSHOT
"              After Visit Summary   1/15/2018    Keysha MILLAN    MRN: [id number]           Patient Information     Date Of Birth          1990        Visit Information        Provider Department      1/15/2018 12:45 PM RD OB NURSE EDUCATION [facility]        Today's Diagnoses     Supervision of normal pregnancy    -  1       Follow-ups after your visit        Who to contact     If you have questions or need follow up information about today's clinic visit or your schedule please contact [facility] directly at [phone].  Normal or non-critical lab and imaging results will be communicated to you by DNA Gameshart, letter or phone within 4 business days after the clinic has received the results. If you do not hear from us within 7 days, please contact the clinic through ViewReplet or phone. If you have a critical or abnormal lab result, we will notify you by phone as soon as possible.  Submit refill requests through [employer] or call your pharmacy and they will forward the refill request to us. Please allow 3 business days for your refill to be completed.          Additional Information About Your Visit        MyChart Information     [employer] gives you secure access to your electronic health record. If you see a primary care provider, you can also send messages to your care team and make appointments. If you have questions, please call your primary care clinic.  If you do not have a primary care provider, please call [phone] and they will assist you.        Care EveryWhere ID     This is your Care EveryWhere ID. This could be used by other organizations to access your [place] medical records  [id number]        Your Vitals Were     Pulse Temperature Height BMI (Body Mass Index)          84 98  F (36.7  C) 5' 2\" (1.575 m) 52.53 kg/m2         Blood Pressure from Last 3 Encounters:   01/15/18 128/80   09/18/17 (!) 139/93   07/14/17 136/60    Weight from Last 3 Encounters:   01/15/18 287 lb " 3.2 oz (130.3 kg)   09/18/17 295 lb 6.4 oz (134 kg)   07/14/17 291 lb 6.4 oz (132.2 kg)              Today, you had the following     No orders found for display       Primary Care Provider Fax #    Physician No Ref-Primary [phone]       No address on file        Equal Access to Services     GURINDERILEANA IRAHETAVLAD : Hadii aad ku hadasho Soomaali, waaxda luqadaha, qaybta kaalmada adeegyada, dar bangura hayankitn adetriston turneredinjason powell . So [facility] [phone].    ATENCIÓN: Si habla español, tiene a hughes disposición servicios gratuitos de asistencia lingüística. Llame al [phone].    We comply with applicable federal civil rights laws and [place] laws. We do not discriminate on the basis of race, color, national origin, age, disability, sex, sexual orientation, or gender identity.            Thank you!     Thank you for choosing [facility]  for your care. Our goal is always to provide you with excellent care. Hearing back from our patients is one way we can continue to improve our services. Please take a few minutes to complete the written survey that you may receive in the mail after your visit with us. Thank you!             Your Updated Medication List - Protect others around you: Learn how to safely use, store and throw away your medicines at www.disposemymeds.org.          This list is accurate as of: 1/15/18  1:22 PM.  Always use your most recent med list.                   Brand Name Dispense Instructions for use Diagnosis    Prenatal Vitamins 28-0.8 MG Tabs      1 Tab daily. chewables

## 2018-01-15 NOTE — MR AVS SNAPSHOT
After Visit Summary   1/15/2018    Keysha MILLAN    MRN: [id number]           Patient Information     Date Of Birth          1990        Visit Information        Provider Department      1/15/2018 1:00 PM Chanelle Sam APRN CNM [facility]        Today's Diagnoses     Encounter for supervision of other normal pregnancy in second trimester    -  1    Morbid obesity (H)         delivery delivered           Follow-ups after your visit        Additional Services     MAT FETAL MED CTR REFERRAL-PREGNANCY       >> Patient may proceed with recommendations for further testing as directed by the Maternal Fetal Medicine Specialist >>    >> If requesting Fetal Echo: MFM will determine appropriate location for exam due to indication.    >> If requesting Lung Maturity Amnio:  If results indicate fetal lung maturity, induction or C/S is recommended within 36 hours.  Please schedule accordingly.     Dear Patient:   Please be aware that coverage of these services is subject to the terms and limitations of your health insurance plan.  Call member services at your health plan with any benefit or coverage questions.      Please bring the following to your appointment:    >>  Any x-rays, CTs or MRIs which have been performed.  Contact the facility where they were done to arrange for  prior to your scheduled appointment.  Any new CT, MRI or other procedures ordered by your specialist must be performed at a [place] facility or coordinated by your clinic's referral office.  >>  List of current medications   >>  This referral request   >>  Any documents/labs given to you for this referral                  Who to contact     If you have questions or need follow up information about today's clinic visit or your schedule please contact [facility] directly at [phone].  Normal or non-critical lab and imaging results will be communicated to you by MyChart, letter or  phone within 4 business days after the clinic has received the results. If you do not hear from us within 7 days, please contact the clinic through registracija vozila or phone. If you have a critical or abnormal lab result, we will notify you by phone as soon as possible.  Submit refill requests through registracija vozila or call your pharmacy and they will forward the refill request to us. Please allow 3 business days for your refill to be completed.          Additional Information About Your Visit        [employer]end[employer] [facility] Information     registracija vozila gives you secure access to your electronic health record. If you see a primary care provider, you can also send messages to your care team and make appointments. If you have questions, please call your primary care clinic.  If you do not have a primary care provider, please call [phone] and they will assist you.        Care EveryWhere ID     This is your Care EveryWhere ID. This could be used by other organizations to access your [place] medical records  [id number]        Your Vitals Were     Last Period                   09/23/2017            Blood Pressure from Last 3 Encounters:   01/15/18 128/80   09/18/17 (!) 139/93   07/14/17 136/60    Weight from Last 3 Encounters:   01/15/18 287 lb 3.2 oz (130.3 kg)   09/18/17 295 lb 6.4 oz (134 kg)   07/14/17 291 lb 6.4 oz (132.2 kg)              We Performed the Following     MAT FETAL MED CTR REFERRAL-PREGNANCY        Primary Care Provider Fax #    Physician No Ref-Primary [phone]       No address on file        Equal Access to Services     CHHAYA RAINES : Hadii ayla lyleo Soamaali, waaxda luqadaha, qaybta kaalmada rich, dar powell . So [facility] [phone].    ATENCIÓN: Si habla español, tiene a hughes disposición servicios gratuitos de asistencia lingüística. Llame al [phone].    We comply with applicable federal civil rights laws and [place] laws. We do not discriminate on the basis of race, color, national  origin, age, disability, sex, sexual orientation, or gender identity.            Thank you!     Thank you for choosing [facility]  for your care. Our goal is always to provide you with excellent care. Hearing back from our patients is one way we can continue to improve our services. Please take a few minutes to complete the written survey that you may receive in the mail after your visit with us. Thank you!             Your Updated Medication List - Protect others around you: Learn how to safely use, store and throw away your medicines at www.disposemymeds.org.          This list is accurate as of: 1/15/18  2:09 PM.  Always use your most recent med list.                   Brand Name Dispense Instructions for use Diagnosis    Prenatal Vitamins 28-0.8 MG Tabs      1 Tab daily. chewables

## 2018-01-15 NOTE — NURSING NOTE
"Chief Complaint   Patient presents with     Prenatal Care     new ob transfer       Initial /80  Pulse 84  Temp 98  F (36.7  C)  Ht 5' 2\" (1.575 m)  Wt 287 lb 3.2 oz (130.3 kg)  BMI 52.53 kg/m2 Estimated body mass index is 52.53 kg/(m^2) as calculated from the following:    Height as of this encounter: 5' 2\" (1.575 m).    Weight as of this encounter: 287 lb 3.2 oz (130.3 kg).  Medication Reconciliation: complete    "

## 2018-01-22 ENCOUNTER — PRE VISIT (OUTPATIENT)
Dept: MATERNAL FETAL MEDICINE | Facility: CLINIC | Age: 28
End: 2018-01-22

## 2018-01-29 ENCOUNTER — HOSPITAL ENCOUNTER (OUTPATIENT)
Dept: ULTRASOUND IMAGING | Facility: CLINIC | Age: 28
Discharge: HOME | End: 2018-01-29
Attending: ADVANCED PRACTICE MIDWIFE | Admitting: ADVANCED PRACTICE MIDWIFE
Payer: COMMERCIAL

## 2018-01-29 ENCOUNTER — OFFICE VISIT (OUTPATIENT)
Dept: MATERNAL FETAL MEDICINE | Facility: CLINIC | Age: 28
End: 2018-01-29
Attending: ADVANCED PRACTICE MIDWIFE
Payer: COMMERCIAL

## 2018-01-29 DIAGNOSIS — O34.219 HISTORY OF CESAREAN DELIVERY AFFECTING PREGNANCY: ICD-10-CM

## 2018-01-29 DIAGNOSIS — O26.90 PREGNANCY RELATED CONDITION, UNSPECIFIED TRIMESTER: ICD-10-CM

## 2018-01-29 DIAGNOSIS — O99.212 OBESITY AFFECTING PREGNANCY IN SECOND TRIMESTER: Primary | ICD-10-CM

## 2018-01-29 PROCEDURE — 76811 OB US DETAILED SNGL FETUS: CPT

## 2018-01-29 NOTE — MR AVS SNAPSHOT
After Visit Summary   2018    Keysha MILLAN    MRN: [id number]           Patient Information     Date Of Birth          1990        Visit Information        Provider Department      2018 2:45 PM Cristina Urbano MD [facility] Maternal Fetal Medicine [facility]        Today's Diagnoses     Obesity affecting pregnancy in second trimester    -  1    History of  delivery affecting pregnancy        Evaluate anatomy not seen on prior sonogram           Follow-ups after your visit        Your next 10 appointments already scheduled     2018  3:45 PM CST   ESTABLISHED PRENATAL with ALIX Gonsalez CNM   [facility] ([facility])    [address] [place] [id number] [phone]              Future tests that were ordered for you today     Open Future Orders        Priority Expected Expires Ordered    [facility] Comprehensive Single F/U Routine 2018            Who to contact     If you have questions or need follow up information about today's clinic visit or your schedule please contact [facility] MATERNAL FETAL MEDICINE [facility] directly at [phone].  Normal or non-critical lab and imaging results will be communicated to you by Yingying Licaihart, letter or phone within 4 business days after the clinic has received the results. If you do not hear from us within 7 days, please contact the clinic through Nalari Healtht or phone. If you have a critical or abnormal lab result, we will notify you by phone as soon as possible.  Submit refill requests through PiCloud or call your pharmacy and they will forward the refill request to us. Please allow 3 business days for your refill to be completed.          Additional Information About Your Visit        Yingying LicaiharSSN Logistics Information     PiCloud gives you secure access to your electronic health record. If you see a primary care provider, you can also send messages to your care  team and make appointments. If you have questions, please call your primary care clinic.  If you do not have a primary care provider, please call [phone] and they will assist you.        Care EveryWhere ID     This is your Care EveryWhere ID. This could be used by other organizations to access your [place] medical records  [id number]        Your Vitals Were     Last Period                   09/23/2017            Blood Pressure from Last 3 Encounters:   01/15/18 128/80   09/18/17 (!) 139/93   07/14/17 136/60    Weight from Last 3 Encounters:   01/15/18 130.3 kg (287 lb 3.2 oz)   09/18/17 134 kg (295 lb 6.4 oz)   07/14/17 132.2 kg (291 lb 6.4 oz)               Primary Care Provider Fax #    Physician No Ref-Primary [phone]       No address on file        Equal Access to Services     CHHAYA RAINES : Hadii ayla lyleo Somiguel, waaxda luqadaha, qaybta kaalmada adetristonyada, dar powell . So [facility] [phone].    ATENCIÓN: Si habla español, tiene a hughes disposición servicios gratuitos de asistencia lingüística. Llame al [phone].    We comply with applicable federal civil rights laws and [place] laws. We do not discriminate on the basis of race, color, national origin, age, disability, sex, sexual orientation, or gender identity.            Thank you!     Thank you for choosing [facility]  for your care. Our goal is always to provide you with excellent care. Hearing back from our patients is one way we can continue to improve our services. Please take a few minutes to complete the written survey that you may receive in the mail after your visit with us. Thank you!             Your Updated Medication List - Protect others around you: Learn how to safely use, store and throw away your medicines at www.disposemymeds.org.          This list is accurate as of 1/29/18  3:40 PM.  Always use your most recent med list.                   Brand Name  Dispense Instructions for use Diagnosis    Prenatal Vitamins 28-0.8 MG Tabs      1 Tab daily. chewables

## 2018-01-29 NOTE — PROGRESS NOTES
Please see ultrasound report under imaging tab for details on ultrasound performed today.    Cristina Urbano MD  , OB/GYN  Maternal-Fetal Medicine  [email or web address]  [phone] (Academic office)  [phone] (Pager)

## 2018-02-09 PROBLEM — Z34.80 SUPERVISION OF OTHER NORMAL PREGNANCY, ANTEPARTUM: Status: ACTIVE | Noted: 2018-02-09

## 2018-02-28 ENCOUNTER — HOSPITAL ENCOUNTER (OUTPATIENT)
Dept: ULTRASOUND IMAGING | Facility: CLINIC | Age: 28
Discharge: HOME | End: 2018-02-28
Attending: OBSTETRICS & GYNECOLOGY | Admitting: SOCIAL WORKER
Payer: COMMERCIAL

## 2018-02-28 ENCOUNTER — OFFICE VISIT (OUTPATIENT)
Dept: MATERNAL FETAL MEDICINE | Facility: CLINIC | Age: 28
End: 2018-02-28
Attending: OBSTETRICS & GYNECOLOGY
Payer: COMMERCIAL

## 2018-02-28 DIAGNOSIS — O99.210 OBESITY IN PREGNANCY, ANTEPARTUM: Primary | ICD-10-CM

## 2018-02-28 DIAGNOSIS — O34.219 HISTORY OF CESAREAN DELIVERY AFFECTING PREGNANCY: ICD-10-CM

## 2018-02-28 PROCEDURE — 76816 OB US FOLLOW-UP PER FETUS: CPT

## 2018-02-28 NOTE — MR AVS SNAPSHOT
After Visit Summary   2018    Keysha MILLAN    MRN: [id number]           Patient Information     Date Of Birth          1990        Visit Information        Provider Department      2018 12:15 PM Cristina Urbano MD [facility] Maternal Fetal Medicine [facility]        Today's Diagnoses     Obesity in pregnancy, antepartum    -  1    History of  delivery affecting pregnancy           Follow-ups after your visit        Future tests that were ordered for you today     Open Future Orders        Priority Expected Expires Ordered    MFM US Comprehensive Single F/U Routine 3/28/2018 2019 2018            Who to contact     If you have questions or need follow up information about today's clinic visit or your schedule please contact [facility] MATERNAL FETAL MEDICINE [facility] directly at [phone].  Normal or non-critical lab and imaging results will be communicated to you by GlobalMotionhart, letter or phone within 4 business days after the clinic has received the results. If you do not hear from us within 7 days, please contact the clinic through GlobalMotionhart or phone. If you have a critical or abnormal lab result, we will notify you by phone as soon as possible.  Submit refill requests through [employer] or call your pharmacy and they will forward the refill request to us. Please allow 3 business days for your refill to be completed.          Additional Information About Your Visit        MyChart Information     [employer] gives you secure access to your electronic health record. If you see a primary care provider, you can also send messages to your care team and make appointments. If you have questions, please call your primary care clinic.  If you do not have a primary care provider, please call [phone] and they will assist you.        Care EveryWhere ID     This is your Care EveryWhere ID. This could be used by other organizations to access your [facility]  records  [id number]        Your Vitals Were     Last Period                   09/23/2017            Blood Pressure from Last 3 Encounters:   01/15/18 128/80   09/18/17 (!) 139/93   07/14/17 136/60    Weight from Last 3 Encounters:   01/15/18 130.3 kg (287 lb 3.2 oz)   09/18/17 134 kg (295 lb 6.4 oz)   07/14/17 132.2 kg (291 lb 6.4 oz)               Primary Care Provider Fax #    Physician No Ref-Primary [phone]       No address on file        Equal Access to Services     [facility] : Hadii ayla pro hadasho Soomaali, waaxda luqadaha, qaybta kaalmada rich, dar powell . So [facility] [phone].    ATENCIÓN: Si habla español, tiene a hughes disposición servicios gratuitos de asistencia lingüística. Llame al [phone].    We comply with applicable federal civil rights laws and [place] laws. We do not discriminate on the basis of race, color, national origin, age, disability, sex, sexual orientation, or gender identity.            Thank you!     Thank you for choosing [facility]  for your care. Our goal is always to provide you with excellent care. Hearing back from our patients is one way we can continue to improve our services. Please take a few minutes to complete the written survey that you may receive in the mail after your visit with us. Thank you!             Your Updated Medication List - Protect others around you: Learn how to safely use, store and throw away your medicines at www.disposemymeds.org.          This list is accurate as of 2/28/18  1:16 PM.  Always use your most recent med list.                   Brand Name Dispense Instructions for use Diagnosis    Prenatal Vitamins 28-0.8 MG Tabs      1 Tab daily. chewables

## 2018-02-28 NOTE — PROGRESS NOTES
Please see ultrasound report under imaging tab for details on ultrasound performed today.    Cristina Urbano MD  , OB/GYN  Maternal-Fetal Medicine  [email or web address]  [phone] (Academic office)  [phone] (Pager)

## 2018-03-01 ENCOUNTER — PRENATAL OFFICE VISIT (OUTPATIENT)
Dept: MIDWIFE SERVICES | Facility: CLINIC | Age: 28
End: 2018-03-01
Payer: COMMERCIAL

## 2018-03-01 VITALS
BODY MASS INDEX: 52.31 KG/M2 | TEMPERATURE: 98.7 F | WEIGHT: 286 LBS | DIASTOLIC BLOOD PRESSURE: 82 MMHG | SYSTOLIC BLOOD PRESSURE: 126 MMHG | HEART RATE: 80 BPM

## 2018-03-01 DIAGNOSIS — Z34.82 ENCOUNTER FOR SUPERVISION OF OTHER NORMAL PREGNANCY IN SECOND TRIMESTER: Primary | ICD-10-CM

## 2018-03-01 PROCEDURE — 99207 ZZC PRENATAL VISIT: CPT | Performed by: ADVANCED PRACTICE MIDWIFE

## 2018-03-01 RX ORDER — BREAST PUMP
1 EACH MISCELLANEOUS PRN
Qty: 1 EACH | Refills: 0 | Status: SHIPPED | OUTPATIENT
Start: 2018-03-01 | End: 2018-06-13

## 2018-03-01 NOTE — PROGRESS NOTES
22w5d  Feeling well. Discussed upcoming GCT. Has another growth ultrasound scheduled at [facility] in 4 wks. Happy to be having a girl. Reports good fetal movement. Denies leaking of fluid, vaginal bleeding, regular uterine contractions, headache or other concerns.  RTC in 4 wks THADDEUS

## 2018-03-01 NOTE — MR AVS SNAPSHOT
After Visit Summary   3/1/2018    Keysha MILLAN    MRN: [id number]           Patient Information     Date Of Birth          1990        Visit Information        Provider Department      3/1/2018 4:00 PM Chanelle Sam APRN CNM [facility]        Today's Diagnoses     Encounter for supervision of other normal pregnancy in second trimester    -  1       Follow-ups after your visit        Future tests that were ordered for you today     Open Future Orders        Priority Expected Expires Ordered    [facility] Comprehensive Single F/U Routine 3/28/2018 2/28/2019 2/28/2018            Who to contact     If you have questions or need follow up information about today's clinic visit or your schedule please contact [facility] directly at [phone].  Normal or non-critical lab and imaging results will be communicated to you by MyChart, letter or phone within 4 business days after the clinic has received the results. If you do not hear from us within 7 days, please contact the clinic through MyChart or phone. If you have a critical or abnormal lab result, we will notify you by phone as soon as possible.  Submit refill requests through VentureH[employer] or call your pharmacy and they will forward the refill request to us. Please allow 3 business days for your refill to be completed.          Additional Information About Your Visit        MyChart Information     [employer] gives you secure access to your electronic health record. If you see a primary care provider, you can also send messages to your care team and make appointments. If you have questions, please call your primary care clinic.  If you do not have a primary care provider, please call [phone] and they will assist you.        Care EveryWhere ID     This is your Care EveryWhere ID. This could be used by other organizations to access your [place] medical records  [id number]        Your Vitals Were     Pulse  Temperature Last Period BMI (Body Mass Index)          80 98.7  F (37.1  C) (Oral) 09/23/2017 52.31 kg/m2         Blood Pressure from Last 3 Encounters:   03/01/18 126/82   01/15/18 128/80   09/18/17 (!) 139/93    Weight from Last 3 Encounters:   03/01/18 286 lb (129.7 kg)   01/15/18 287 lb 3.2 oz (130.3 kg)   09/18/17 295 lb 6.4 oz (134 kg)              Today, you had the following     No orders found for display         Today's Medication Changes          These changes are accurate as of 3/1/18  5:24 PM.  If you have any questions, ask your nurse or doctor.               Start taking these medicines.        Dose/Directions    breast pump Misc   Started by:  Chanelle Sam APRN CNM        Dose:  1 each   1 each as needed   Quantity:  1 each   Refills:  0            Where to get your medicines      Some of these will need a paper prescription and others can be bought over the counter.  Ask your nurse if you have questions.     Bring a paper prescription for each of these medications     breast pump Misc                Primary Care Provider Fax #    Physician No Ref-Primary [phone]       No address on file        Equal Access to Services     CHHAYA RAINES AH: Jd Alarcon, waalexx cuevas, qaybta kaalmada adetristonyada, dar torres. So [facility] [phone].    ATENCIÓN: Si habla español, tiene a hughes disposición servicios gratuitos de asistencia lingüística. Llame al [phone].    We comply with applicable federal civil rights laws and [place] laws. We do not discriminate on the basis of race, color, national origin, age, disability, sex, sexual orientation, or gender identity.            Thank you!     Thank you for choosing [facility]  for your care. Our goal is always to provide you with excellent care. Hearing back from our patients is one way we can continue to improve our services. Please take a few minutes to complete the written survey  that you may receive in the mail after your visit with us. Thank you!             Your Updated Medication List - Protect others around you: Learn how to safely use, store and throw away your medicines at www.disposemymeds.org.          This list is accurate as of 3/1/18  5:24 PM.  Always use your most recent med list.                   Brand Name Dispense Instructions for use Diagnosis    breast pump Misc     1 each    1 each as needed        Prenatal Vitamins 28-0.8 MG Tabs      1 Tab daily. chewables

## 2018-04-12 ENCOUNTER — HOSPITAL ENCOUNTER (OUTPATIENT)
Dept: ULTRASOUND IMAGING | Facility: CLINIC | Age: 28
End: 2018-04-12
Attending: ADVANCED PRACTICE MIDWIFE
Payer: COMMERCIAL

## 2018-04-12 ENCOUNTER — HOSPITAL ENCOUNTER (OUTPATIENT)
Facility: CLINIC | Age: 28
Setting detail: OBSERVATION
Discharge: HOME | End: 2018-04-13
Attending: OBSTETRICS & GYNECOLOGY | Admitting: ADVANCED PRACTICE MIDWIFE
Payer: COMMERCIAL

## 2018-04-12 ENCOUNTER — DOCUMENTATION ONLY (OUTPATIENT)
Dept: MATERNAL FETAL MEDICINE | Facility: CLINIC | Age: 28
End: 2018-04-12

## 2018-04-12 ENCOUNTER — NURSE TRIAGE (OUTPATIENT)
Dept: NURSING | Facility: CLINIC | Age: 28
End: 2018-04-12

## 2018-04-12 DIAGNOSIS — R30.0 DYSURIA: Primary | ICD-10-CM

## 2018-04-12 PROBLEM — N20.0 KIDNEY STONE COMPLICATING PREGNANCY: Status: ACTIVE | Noted: 2018-04-12

## 2018-04-12 PROBLEM — O99.891 KIDNEY STONE COMPLICATING PREGNANCY: Status: ACTIVE | Noted: 2018-04-12

## 2018-04-12 PROBLEM — Z36.89 ENCOUNTER FOR TRIAGE IN PREGNANT PATIENT: Status: ACTIVE | Noted: 2018-04-12

## 2018-04-12 LAB
ABO + RH BLD: NORMAL
ABO + RH BLD: NORMAL
ALBUMIN UR-MCNC: 100 MG/DL
APPEARANCE UR: ABNORMAL
BACTERIA #/AREA URNS HPF: ABNORMAL /HPF
BILIRUB UR QL STRIP: NEGATIVE
BLD GP AB SCN SERPL QL: NORMAL
BLOOD BANK CMNT PATIENT-IMP: NORMAL
COLOR UR AUTO: ABNORMAL
ERYTHROCYTE [DISTWIDTH] IN BLOOD BY AUTOMATED COUNT: 15 % (ref 10–15)
GLUCOSE UR STRIP-MCNC: NEGATIVE MG/DL
HCT VFR BLD AUTO: 34 % (ref 35–47)
HGB BLD-MCNC: 10.9 G/DL (ref 11.7–15.7)
HGB UR QL STRIP: ABNORMAL
KETONES UR STRIP-MCNC: 10 MG/DL
LEUKOCYTE ESTERASE UR QL STRIP: ABNORMAL
MCH RBC QN AUTO: 27.7 PG (ref 26.5–33)
MCHC RBC AUTO-ENTMCNC: 32.1 G/DL (ref 31.5–36.5)
MCV RBC AUTO: 86 FL (ref 78–100)
NITRATE UR QL: POSITIVE
PH UR STRIP: 6.5 PH (ref 5–7)
PLATELET # BLD AUTO: 392 10E9/L (ref 150–450)
RBC # BLD AUTO: 3.94 10E12/L (ref 3.8–5.2)
RBC #/AREA URNS AUTO: >182 /HPF (ref 0–2)
SOURCE: ABNORMAL
SP GR UR STRIP: 1.02 (ref 1–1.03)
SPECIMEN EXP DATE BLD: NORMAL
SPECIMEN SOURCE: NORMAL
TRANS CELLS #/AREA URNS HPF: 3 /HPF (ref 0–1)
UROBILINOGEN UR STRIP-MCNC: NORMAL MG/DL (ref 0–2)
WBC # BLD AUTO: 16.3 10E9/L (ref 4–11)
WBC #/AREA URNS AUTO: 45 /HPF (ref 0–5)
WET PREP SPEC: NORMAL

## 2018-04-12 PROCEDURE — 96375 TX/PRO/DX INJ NEW DRUG ADDON: CPT

## 2018-04-12 PROCEDURE — 76816 OB US FOLLOW-UP PER FETUS: CPT

## 2018-04-12 PROCEDURE — 87210 SMEAR WET MOUNT SALINE/INK: CPT | Performed by: ADVANCED PRACTICE MIDWIFE

## 2018-04-12 PROCEDURE — 96361 HYDRATE IV INFUSION ADD-ON: CPT

## 2018-04-12 PROCEDURE — 86901 BLOOD TYPING SEROLOGIC RH(D): CPT | Performed by: ADVANCED PRACTICE MIDWIFE

## 2018-04-12 PROCEDURE — 25000132 ZZH RX MED GY IP 250 OP 250 PS 637: Performed by: ADVANCED PRACTICE MIDWIFE

## 2018-04-12 PROCEDURE — 87088 URINE BACTERIA CULTURE: CPT | Performed by: ADVANCED PRACTICE MIDWIFE

## 2018-04-12 PROCEDURE — 96360 HYDRATION IV INFUSION INIT: CPT

## 2018-04-12 PROCEDURE — 86900 BLOOD TYPING SEROLOGIC ABO: CPT | Performed by: ADVANCED PRACTICE MIDWIFE

## 2018-04-12 PROCEDURE — 25000128 H RX IP 250 OP 636: Performed by: ADVANCED PRACTICE MIDWIFE

## 2018-04-12 PROCEDURE — G0378 HOSPITAL OBSERVATION PER HR: HCPCS

## 2018-04-12 PROCEDURE — 87653 STREP B DNA AMP PROBE: CPT | Performed by: ADVANCED PRACTICE MIDWIFE

## 2018-04-12 PROCEDURE — G0463 HOSPITAL OUTPT CLINIC VISIT: HCPCS

## 2018-04-12 PROCEDURE — 85027 COMPLETE CBC AUTOMATED: CPT | Performed by: ADVANCED PRACTICE MIDWIFE

## 2018-04-12 PROCEDURE — 59025 FETAL NON-STRESS TEST: CPT | Mod: 26 | Performed by: ADVANCED PRACTICE MIDWIFE

## 2018-04-12 PROCEDURE — 87186 SC STD MICRODIL/AGAR DIL: CPT | Performed by: ADVANCED PRACTICE MIDWIFE

## 2018-04-12 PROCEDURE — 99213 OFFICE O/P EST LOW 20 MIN: CPT | Mod: 25 | Performed by: ADVANCED PRACTICE MIDWIFE

## 2018-04-12 PROCEDURE — 96366 THER/PROPH/DIAG IV INF ADDON: CPT

## 2018-04-12 PROCEDURE — 87086 URINE CULTURE/COLONY COUNT: CPT | Performed by: ADVANCED PRACTICE MIDWIFE

## 2018-04-12 PROCEDURE — 86850 RBC ANTIBODY SCREEN: CPT | Performed by: ADVANCED PRACTICE MIDWIFE

## 2018-04-12 PROCEDURE — 96374 THER/PROPH/DIAG INJ IV PUSH: CPT

## 2018-04-12 PROCEDURE — 81001 URINALYSIS AUTO W/SCOPE: CPT | Performed by: ADVANCED PRACTICE MIDWIFE

## 2018-04-12 RX ORDER — OXYCODONE AND ACETAMINOPHEN 5; 325 MG/1; MG/1
1-2 TABLET ORAL EVERY 4 HOURS PRN
Status: DISCONTINUED | OUTPATIENT
Start: 2018-04-12 | End: 2018-04-13 | Stop reason: HOSPADM

## 2018-04-12 RX ORDER — SODIUM CHLORIDE, SODIUM LACTATE, POTASSIUM CHLORIDE, CALCIUM CHLORIDE 600; 310; 30; 20 MG/100ML; MG/100ML; MG/100ML; MG/100ML
INJECTION, SOLUTION INTRAVENOUS CONTINUOUS
Status: DISCONTINUED | OUTPATIENT
Start: 2018-04-12 | End: 2018-04-13 | Stop reason: HOSPADM

## 2018-04-12 RX ORDER — CEFTRIAXONE 1 G/1
1 INJECTION, POWDER, FOR SOLUTION INTRAMUSCULAR; INTRAVENOUS EVERY 24 HOURS
Status: DISCONTINUED | OUTPATIENT
Start: 2018-04-12 | End: 2018-04-13 | Stop reason: HOSPADM

## 2018-04-12 RX ORDER — ACETAMINOPHEN 325 MG/1
650 TABLET ORAL EVERY 4 HOURS PRN
Status: DISCONTINUED | OUTPATIENT
Start: 2018-04-12 | End: 2018-04-13 | Stop reason: HOSPADM

## 2018-04-12 RX ORDER — FENTANYL CITRATE 50 UG/ML
50-100 INJECTION, SOLUTION INTRAMUSCULAR; INTRAVENOUS
Status: DISCONTINUED | OUTPATIENT
Start: 2018-04-12 | End: 2018-04-13 | Stop reason: HOSPADM

## 2018-04-12 RX ORDER — NALOXONE HYDROCHLORIDE 0.4 MG/ML
.1-.4 INJECTION, SOLUTION INTRAMUSCULAR; INTRAVENOUS; SUBCUTANEOUS
Status: DISCONTINUED | OUTPATIENT
Start: 2018-04-12 | End: 2018-04-13 | Stop reason: HOSPADM

## 2018-04-12 RX ORDER — SODIUM CHLORIDE, SODIUM LACTATE, POTASSIUM CHLORIDE, CALCIUM CHLORIDE 600; 310; 30; 20 MG/100ML; MG/100ML; MG/100ML; MG/100ML
INJECTION, SOLUTION INTRAVENOUS CONTINUOUS
Status: DISCONTINUED | OUTPATIENT
Start: 2018-04-12 | End: 2018-04-12

## 2018-04-12 RX ORDER — SODIUM CHLORIDE 9 MG/ML
INJECTION, SOLUTION INTRAVENOUS CONTINUOUS
Status: DISCONTINUED | OUTPATIENT
Start: 2018-04-13 | End: 2018-04-13 | Stop reason: HOSPADM

## 2018-04-12 RX ORDER — SODIUM CHLORIDE 9 MG/ML
INJECTION, SOLUTION INTRAVENOUS CONTINUOUS
Status: DISCONTINUED | OUTPATIENT
Start: 2018-04-12 | End: 2018-04-12

## 2018-04-12 RX ORDER — ONDANSETRON 2 MG/ML
4 INJECTION INTRAMUSCULAR; INTRAVENOUS EVERY 6 HOURS PRN
Status: DISCONTINUED | OUTPATIENT
Start: 2018-04-12 | End: 2018-04-13 | Stop reason: HOSPADM

## 2018-04-12 RX ORDER — HYDROXYZINE HYDROCHLORIDE 50 MG/1
100 TABLET, FILM COATED ORAL
Status: DISCONTINUED | OUTPATIENT
Start: 2018-04-12 | End: 2018-04-13 | Stop reason: HOSPADM

## 2018-04-12 RX ADMIN — FENTANYL CITRATE 100 MCG: 50 INJECTION, SOLUTION INTRAMUSCULAR; INTRAVENOUS at 10:28

## 2018-04-12 RX ADMIN — OXYCODONE HYDROCHLORIDE AND ACETAMINOPHEN 2 TABLET: 5; 325 TABLET ORAL at 20:49

## 2018-04-12 RX ADMIN — SODIUM CHLORIDE, POTASSIUM CHLORIDE, SODIUM LACTATE AND CALCIUM CHLORIDE: 600; 310; 30; 20 INJECTION, SOLUTION INTRAVENOUS at 08:50

## 2018-04-12 RX ADMIN — HYDROXYZINE HYDROCHLORIDE 100 MG: 50 TABLET, FILM COATED ORAL at 22:13

## 2018-04-12 RX ADMIN — SODIUM CHLORIDE, POTASSIUM CHLORIDE, SODIUM LACTATE AND CALCIUM CHLORIDE: 600; 310; 30; 20 INJECTION, SOLUTION INTRAVENOUS at 15:50

## 2018-04-12 RX ADMIN — OXYCODONE HYDROCHLORIDE AND ACETAMINOPHEN 2 TABLET: 5; 325 TABLET ORAL at 12:26

## 2018-04-12 RX ADMIN — SODIUM CHLORIDE: 9 INJECTION, SOLUTION INTRAVENOUS at 12:27

## 2018-04-12 RX ADMIN — SODIUM CHLORIDE, POTASSIUM CHLORIDE, SODIUM LACTATE AND CALCIUM CHLORIDE: 600; 310; 30; 20 INJECTION, SOLUTION INTRAVENOUS at 10:30

## 2018-04-12 RX ADMIN — CEFTRIAXONE SODIUM 1 G: 1 INJECTION, POWDER, FOR SOLUTION INTRAMUSCULAR; INTRAVENOUS at 12:27

## 2018-04-12 RX ADMIN — SODIUM CHLORIDE, POTASSIUM CHLORIDE, SODIUM LACTATE AND CALCIUM CHLORIDE: 600; 310; 30; 20 INJECTION, SOLUTION INTRAVENOUS at 22:16

## 2018-04-12 RX ADMIN — OXYCODONE HYDROCHLORIDE AND ACETAMINOPHEN 2 TABLET: 5; 325 TABLET ORAL at 16:42

## 2018-04-12 RX ADMIN — RANITIDINE 150 MG: 150 TABLET, FILM COATED ORAL at 22:13

## 2018-04-12 RX ADMIN — FENTANYL CITRATE 100 MCG: 50 INJECTION, SOLUTION INTRAMUSCULAR; INTRAVENOUS at 08:54

## 2018-04-12 RX ADMIN — FENTANYL CITRATE 100 MCG: 50 INJECTION, SOLUTION INTRAMUSCULAR; INTRAVENOUS at 11:42

## 2018-04-12 NOTE — IP AVS SNAPSHOT
[facility]    [address]    [place] [address]    Phone:  [phone]                                       After Visit Summary   4/12/2018    Keysha MILLAN    MRN: [id number]           After Visit Summary Signature Page     I have received my discharge instructions, and my questions have been answered. I have discussed any challenges I see with this plan with the nurse or doctor.    ..........................................................................................................................................  Patient/Patient Representative Signature      ..........................................................................................................................................  Patient Representative Print Name and Relationship to Patient    ..................................................               ................................................  Date                                            Time    ..........................................................................................................................................  Reviewed by Signature/Title    ...................................................              ..............................................  Date                                                            Time

## 2018-04-12 NOTE — PLAN OF CARE
Problem: Patient Care Overview  Goal: Plan of Care/Patient Progress Review  Data: [facility] at 0745.   Reason for maternal/fetal assessment per patient is Rule Out Labor  .  Patient is a . Prenatal record reviewed.      Obstetric History       T2      L2     SAB0   TAB0   Ectopic0   Multiple0   Live Births2       # Outcome Date GA Lbr Mazin/2nd Weight Sex Delivery Anes PTL Lv   3 Current            2 Term 16 41w0d  4.026 kg (8 lb 14 oz) M    COREY   1 Term 14 37w0d  2.863 kg (6 lb 5 oz) M -SEC Gen N COREY      . Medical history:   Past Medical History:   Diagnosis Date     NO ACTIVE PROBLEMS    . Gestational Age 28w5d. VSS. Fetal movement present. Patient denies  backache, vaginal discharge, pelvic pressure, UTI symptoms, GI problems, bloody show, edema, headache, visual disturbances or rupture of membranes. Support persons are present, her  Eb. Pt reports N & V during the night. VSS, afebrile.  Action: Verbal consent for EFM. Triage assessment completed. MFM US done at bedside. Fetal assessment: Presumed adequate fetal oxygenation documented per US at BS (see flow record). SVE closed/long/high. Pt reports blood in urine.  Response:  Chanelle Sam CNM informed of arrival. Plan per provider is IV hydration, IV fentanyl, UA, blood work, r/o kidney stones. Patient verbalized agreement with plan.

## 2018-04-12 NOTE — PLAN OF CARE
"Patient continues to C/O R flank pain, has had some relief with Percocet, using hot packs.  Has been voiding small amounts of cherry color urine with sediments.  Patient states felt like she \"passed something\"  but nothing in strainer.   Will continue to monitor and will notify provider if decline in status.   "

## 2018-04-12 NOTE — PROGRESS NOTES
"S: Keysha continues to have RLQ pain. IV fentanyl is helping with the pain and she reports no pain during therapuetic window.      HPI  Keysha woke at 0430 with colicky pain in her RLQ that radiates up under her ribs and down, toward her groin. She took a tub bath for comfort with little relief. She voided and noticed blood in the toilet and when she wiped. She presented today to triage for evaluation of her pain and bleeding. She denies a hx of kidney stones. She has been taking about 2 tums tablets at night for GERD. She denies vaginal irritation or changes in vaginal discharge other than bleeding when she wiped.       LABS:               Lab Results   Component Value Date     ABO O 04/12/2018             Lab Results   Component Value Date     RH Pos 04/12/2018      Rhogam not indicated  Rubella immune                      Treponema Pallidum Antibody  Negative    HIV    Non-reactive         Lab Results   Component Value Date     HGB 10.9 04/12/2018       No results found for: HEPBANG  No results found for: GBS  other      ROS  C: NEGATIVE for fever, chills, change in weight  I: NEGATIVE for worrisome rashes, moles or lesions  E/M: NEGATIVE for ear, mouth and throat problems  R: NEGATIVE for significant cough or SOB  CV: NEGATIVE for chest pain, palpitations or peripheral edema  GI: NEGATIVE for nausea, heartburn, or change in bowel habits Positive for RLQ pain that is colicky in nature and radiate to her right ribs and groin area.   : NEGATIVE for frequency, POSITIVE for hematuria.   PSYCHIATRIC:  NEGATIVE for depression, anxiety or other mental health concerns        PHYSICAL EXAM:  /84  Pulse 102  Temp 99.2  F (37.3  C) (Oral)  Ht 1.575 m (5' 2\")  Wt 131.5 kg (290 lb)  LMP 09/23/2017  BMI 53.04 kg/m2  General appearance healthy, alert, active and moderate distress  Neuro:  denies headache and visual disturbances  Psych: Mentation normal and bright   Legs: 2+/2+, no clonus, no edema         Abdomen: " gravid, single vertex fetus, non-tender, EFW 4 lbs. Pt is not patrick     FETAL HEART TONES: baseline 145 with moderate FHR variability and accelerations.  No decelerations present.      Color Urine (no units)   Date Value   2018 Dark Red     Appearance Urine (no units)   Date Value   2018 Slightly Cloudy     Glucose Urine (mg/dL)   Date Value   2018 Negative     Bilirubin Urine (no units)   Date Value   2018 Negative     Ketones Urine (mg/dL)   Date Value   2018 10 (A)     Specific Gravity Urine (no units)   Date Value   2018 1.018     pH Urine (pH)   Date Value   2018 6.5     Protein Albumin Urine (mg/dL)   Date Value   2018 100 (A)     Urobilinogen Urine (EU/dL)   Date Value   10/17/2010 0.2     Nitrite Urine (no units)   Date Value   2018 Positive (A)     Leukocyte Esterase Urine (no units)   Date Value   2018 Moderate (A)            ASSESSMENT:  IUP @ 28 wks gestation in not in labor- No evidence shows  labor risk.  NST  reactive    WBC shift: infection process  UA shows Nitrites, bacteria, WBCs- suspect acute cystitis but cannot rule out pyelonephritis so will treat a such.   LIkely kidney stone     PLAN:  Rocephin given for pyelonephritis.    IV fentanyl given for pain  Plan for IV fluid bolus and pain medication until stone passes.  Admit to observation for continued IV, Pain control. Anticipate staying for second dose of rocephin and DC home with course of PO macrobid.         Chanelle Sam CNM

## 2018-04-12 NOTE — IP AVS SNAPSHOT
MRN:[id number]                      After Visit Summary   4/12/2018    Keysha MILLAN    MRN: [id number]           Thank you!     Thank you for choosing [facility] for your care. Our goal is always to provide you with excellent care. Hearing back from our patients is one way we can continue to improve our services. Please take a few minutes to complete the written survey that you may receive in the mail after you visit with us. Thank you!        Patient Information     Date Of Birth          1990        Designated Caregiver       Most Recent Value    Caregiver    Will someone help with your care after discharge? no      About your hospital stay     You were admitted on:  April 12, 2018 You last received care in the:  [facility]    You were discharged on:  April 13, 2018       Who to Call     For medical emergencies, please call 911.  For non-urgent questions about your medical care, please call your primary care provider or clinic, None          Attending Provider     Provider Specialty    Cristina Boone MD OB/Gyn    Chanelle Sam APRN [facility] OB/[facility]       Primary Care Provider Fax #    Physician No Ref-Primary [phone]      Your next 10 appointments already scheduled     Apr 18, 2018  2:30 PM CDT   LAB with RD LAB   [facility] ([facility])    [address] [place] [id number] [phone]           Please do not eat 10-12 hours before your appointment if you are coming in fasting for labs on lipids, cholesterol, or glucose (sugar). This does not apply to pregnant women. Water, hot tea and black coffee (with nothing added) are okay. Do not drink other fluids, diet soda or chew gum.            Apr 18, 2018  2:45 PM CDT   ESTABLISHED PRENATAL with Merry Lopez MD   [facility] ([facility])    [address] [place] [id number] [phone]            May 09,  2018 11:30 AM CDT   (Arrive by 11:15 AM)   ESTABLISHED PRENATAL with ALIX Amaral CNM   [facility]    [address]  [address]  [facility] [id number] [phone]            May 23, 2018  2:00 PM CDT   ESTABLISHED PRENATAL with ALIX Ramsey CNM   [facility] ([facility]    [address]  [address]  [facility] [id number] [phone]              Further instructions from your care team       Discharge Instruction for Undelivered Patients      You were seen for: flank pain and hematuria  We Consulted: Chanelle Shetty CNM and Ilda Pabon CNM  You had (Test or Medicine): labs, pain medication and     Diet:   Drink 8 to 12 glasses of liquids (milk, juice, water) every day.  You may eat meals and snacks.     Activity:  Call your doctor or nurse midwife if your baby is moving less than usual.     Call your provider if you notice:  Swelling in your face or increased swelling in your hands or legs.  Headaches that are not relieved by Tylenol (acetaminophen).  Changes in your vision (blurring: seeing spots or stars.)  Nausea (sick to your stomach) and vomiting (throwing up).   Weight gain of 5 pounds or more per week.  Heartburn that doesn't go away.  Signs of bladder infection: pain when you urinate (use the toilet), need to go more often and more urgently.  The bag of marsh (rupture of membranes) breaks, or you notice leaking in your underwear.  Bright red blood in your underwear.  Abdominal (lower belly) or stomach pain.  For first baby: Contractions (tightening) less than 5 minutes apart for one hour or more.  Second (plus) baby: Contractions (tightening) less than 10 minutes apart and getting stronger.  *If less than 34 weeks: Contractions (tightenings) more than 6 times in one hour.  Increase or change in vaginal discharge (note the color and amount)      Follow-up: as instructed by  "your midwife              Pending Results     Date and Time Order Name Status Description    4/12/2018 0945 Urine Culture Aerobic Bacterial Preliminary             Statement of Approval     Ordered          04/13/18 1219  I have reviewed and agree with all the recommendations and orders detailed in this document.  EFFECTIVE NOW     Approved and electronically signed by:  Merry Pabon APRN CNM             Admission Information     Date & Time Provider Department Dept. Phone    4/12/2018 Chanelle Sam APRN CNM [facility] [phone]      Your Vitals Were     Blood Pressure Pulse Temperature Respirations Height Weight    122/70 102 99  F (37.2  C) 20 1.575 m (5' 2\") 131.5 kg (290 lb)    Last Period Pulse Oximetry BMI (Body Mass Index)             09/23/2017 94% 53.04 kg/m2         MyChart Information     [facility] gives you secure access to your electronic health record. If you see a primary care provider, you can also send messages to your care team and make appointments. If you have questions, please call your primary care clinic.  If you do not have a primary care provider, please call [phone] and they will assist you.        Care EveryWhere ID     This is your Care EveryWhere ID. This could be used by other organizations to access your [facility] medical records  [id number]        Equal Access to Services     CHHAYA RAINES : Jd lyleo Soamaali, waaxda luqadaha, qaybta kaalmada adeegyada, dar torres. So [facility] [phone].    ATENCIÓN: Si habla español, tiene a hughes disposición servicios gratuitos de asistencia lingüística. Llame al [phone].    We comply with applicable federal civil rights laws and [place] laws. We do not discriminate on the basis of race, color, national origin, age, disability, sex, sexual orientation, or gender identity.               Review of your medicines      START taking        Dose / Directions    nitroFURantoin " (macrocrystal-monohydrate) 100 MG capsule   Commonly known as:  MACROBID   Used for:  Dysuria        Dose:  100 mg   Take 1 capsule (100 mg) by mouth 2 times daily   Quantity:  20 capsule   Refills:  0         CONTINUE these medicines which have NOT CHANGED        Dose / Directions    ASPIRIN PO        Dose:  81 mg   Take 81 mg by mouth daily   Refills:  0       breast pump Misc        Dose:  1 each   1 each as needed   Quantity:  1 each   Refills:  0       Prenatal Vitamins 28-0.8 MG Tabs        1 Tab daily. chewables   Refills:  0            Where to get your medicines      These medications were sent to [facility] [place], [place] - [address]  [address] [place] [address], [facility] [address]     Phone:  [phone]     nitroFURantoin (macrocrystal-monohydrate) 100 MG capsule                Protect others around you: Learn how to safely use, store and throw away your medicines at www.disposemymeds.org.             Medication List: This is a list of all your medications and when to take them. Check marks below indicate your daily home schedule. Keep this list as a reference.      Medications           Morning Afternoon Evening Bedtime As Needed    ASPIRIN PO   Take 81 mg by mouth daily                                breast pump Misc   1 each as needed                                nitroFURantoin (macrocrystal-monohydrate) 100 MG capsule   Commonly known as:  MACROBID   Take 1 capsule (100 mg) by mouth 2 times daily                                Prenatal Vitamins 28-0.8 MG Tabs   1 Tab daily. chewables

## 2018-04-12 NOTE — TELEPHONE ENCOUNTER
"  Reason for Disposition    [1] Vomiting AND [2] contains red blood or black (\"coffee ground\") material  (Exception: few red streaks in vomit that only happened once)    Additional Information    Negative: Passed out (i.e., lost consciousness, collapsed and was not responding)    Negative: Shock suspected (e.g., cold/pale/clammy skin, too weak to stand, low BP, rapid pulse)    Negative: Difficult to awaken or acting confused  (e.g., disoriented, slurred speech)    Negative: [1] SEVERE abdominal pain (e.g., excruciating) AND [2] constant AND [3] present > 1 hour     Pain at 7,. Constant.    Negative: SEVERE vaginal bleeding (e.g., continuous red blood from vagina, or large blood clots)    Negative: Sounds like a life-threatening emergency to the triager    Negative: Followed an abdomen (stomach) injury    Negative: [1] Having contractions or other symptoms of labor AND [2] >= 37 weeks pregnant (i.e., term pregnancy)    Negative: [1] Having contractions or other symptoms of labor AND [2] < 37 weeks pregnant (i.e., )    Negative: [1] Abdominal pain AND [2] pregnant < 20 weeks    Protocols used: PREGNANCY - ABDOMINAL PAIN GREATER THAN 20 WEEKS EGA-ADULT-AH    She said she did vomit up some blood which she thinks is from the force of vomiting. She has constant pains since around 4 a.m. And it's at a 7 on the pain scale.  She had some vaginal blood with going to the bathroom. She does feel the baby moving. She states she has an ultrasound at 8 a.m. But won't be going to that. I told her definitely get to an ER right away.  Maia Conn RN-[facility]    "

## 2018-04-12 NOTE — H&P
Keysha MILLAN is a 27 year old female,  ,         Patient's last menstrual period was 2017.. Estimated Date of Delivery: 2018 is calculated from lmp and verified with U/S    [facility] for [facility] of Vaginal bleeding, abdominal pain    HPI  Keysha woke at 0430 with colicky pain in her RLQ that radiates up under her ribs and down, toward her groin. She took a tub bath for comfort with little relief. She voided and noticed blood in the toilet and when she wiped. She presented today to triage for evaluation of her pain and bleeding. She denies a hx of kidney stones. She has been taking about 2 tums tablets at night for GERD. She denies vaginal irritation or changes in vaginal discharge other than bleeding when she wiped.     PRENATAL COURSE  Prenatal care began at 10 wks gestation for a total of 4 prenatal visits.  Total wt gain uncertain. Pregravid weight appears to be a typo. Estimate at 5 lbs.   Prenatal vital signs WNL  Prenatal course was   complicated by    Patient Active Problem List    Diagnosis Date Noted     Encounter for triage in pregnant patient 2018     Priority: Medium     Supervision of other normal pregnancy, antepartum 2018     Priority: Medium     MFM survey are within normal limits but unable to see baby well r/t habitus. ultrasound scheduled in 4 wks to reassess.   NEeds early GCT.   Hx of primary C/s and Successful .       Migraine 01/15/2018     Priority: Medium     Overview:   Occasional, none in pregnancy       Need for Tdap vaccination 01/15/2018     Priority: Medium     Obesity affecting pregnancy, antepartum 2017     Priority: Medium      delivery delivered 05/10/2014     Priority: Medium     GBS carrier 05/10/2014     Priority: Medium     Preeclampsia 05/10/2014     Priority: Medium     Hx of gestational HTN with first baby and pre-eclampsia with Tx with Mag postpartum. Taking baby aspirin  "with this pregnancy.        PCOS (polycystic ovarian syndrome) 2014     Priority: Medium       HISTORIES  No Known Allergies  Past Medical History:   Diagnosis Date     NO ACTIVE PROBLEMS      Past Surgical History:   Procedure Laterality Date      SECTION  2014     No family history on file.  Social History   Substance Use Topics     Smoking status: Never Smoker     Smokeless tobacco: Never Used     Alcohol use Yes      Comment: [place]       LABS:       Lab Results   Component Value Date    ABO O 2018       Lab Results   Component Value Date    RH Pos 2018     Rhogam not indicated  Rubella immune   Treponema Pallidum Antibody  Negative    HIV    Non-reactive   Lab Results   Component Value Date    HGB 10.9 2018      No results found for: HEPBANG  No results found for: GBS  other     ROS  C: NEGATIVE for fever, chills, change in weight  I: NEGATIVE for worrisome rashes, moles or lesions  E/M: NEGATIVE for ear, mouth and throat problems  R: NEGATIVE for significant cough or SOB  CV: NEGATIVE for chest pain, palpitations or peripheral edema  GI: NEGATIVE for nausea, heartburn, or change in bowel habits Positive for RLQ pain that is colicky in nature and radiate to her right ribs and groin area.   : NEGATIVE for frequency, POSITIVE for hematuria.   PSYCHIATRIC:  NEGATIVE for depression, anxiety or other mental health concerns      PHYSICAL EXAM:  /84  Pulse 102  Temp 99.2  F (37.3  C) (Oral)  Ht 1.575 m (5' 2\")  Wt 131.5 kg (290 lb)  LMP 2017  BMI 53.04 kg/m2  General appearance healthy, alert, active and moderate distress  Neuro:  denies headache and visual disturbances  Psych: Mentation normal and bright   Legs: 2+/2+, no clonus, no edema       Abdomen: gravid, single vertex fetus, non-tender, EFW 4 lbs. Pt is not patrick    FETAL HEART TONES: baseline 145 with moderate FHR variability and accelerations.  No decelerations present.     MEMBRANES: Membranes " are intact    PELVIC EXAM: closed/ 0/ FLOATING. No blood noted in vagina.  BLOODY SHOW:: no    Lab Results   Component Value Date    WBC 16.3 2018     Lab Results   Component Value Date    RBC 3.94 2018     Lab Results   Component Value Date    HGB 10.9 2018     Lab Results   Component Value Date    HCT 34.0 2018     No components found for: MCT  Lab Results   Component Value Date    MCV 86 2018     Lab Results   Component Value Date    MCH 27.7 2018     Lab Results   Component Value Date    MCHC 32.1 2018     Lab Results   Component Value Date    RDW 15.0 2018     Lab Results   Component Value Date     2018     Wet prep negative  UA collected. -Port wine color    ASSESSMENT:  IUP @ 28 wks gestation in not in labor- No evidence shows  labor risk.  NST  reactive    LIkely kidney stone    PLAN:  IV started and IV fentanyl given for pain  Plan for IV fluid bolus and pain medication until stone passes.  Admit to observation for continued IV, Pain control.     Chanelle Sam CNM

## 2018-04-12 NOTE — PROGRESS NOTES
Please refer to ultrasound report under 'Imaging' Studies of 'Chart Review' tabs.    John Hernandez M.D.

## 2018-04-13 VITALS
WEIGHT: 290 LBS | TEMPERATURE: 99 F | HEART RATE: 102 BPM | DIASTOLIC BLOOD PRESSURE: 70 MMHG | SYSTOLIC BLOOD PRESSURE: 122 MMHG | RESPIRATION RATE: 20 BRPM | OXYGEN SATURATION: 94 % | BODY MASS INDEX: 53.37 KG/M2 | HEIGHT: 62 IN

## 2018-04-13 LAB
GP B STREP DNA SPEC QL NAA+PROBE: NEGATIVE
SPECIMEN SOURCE: NORMAL

## 2018-04-13 PROCEDURE — 99212 OFFICE O/P EST SF 10 MIN: CPT | Performed by: ADVANCED PRACTICE MIDWIFE

## 2018-04-13 PROCEDURE — 96361 HYDRATE IV INFUSION ADD-ON: CPT

## 2018-04-13 PROCEDURE — 25000128 H RX IP 250 OP 636: Performed by: ADVANCED PRACTICE MIDWIFE

## 2018-04-13 PROCEDURE — G0378 HOSPITAL OBSERVATION PER HR: HCPCS

## 2018-04-13 PROCEDURE — 96376 TX/PRO/DX INJ SAME DRUG ADON: CPT

## 2018-04-13 PROCEDURE — 25000132 ZZH RX MED GY IP 250 OP 250 PS 637: Performed by: ADVANCED PRACTICE MIDWIFE

## 2018-04-13 RX ORDER — NITROFURANTOIN 25; 75 MG/1; MG/1
100 CAPSULE ORAL 2 TIMES DAILY
Qty: 20 CAPSULE | Refills: 0 | Status: SHIPPED | OUTPATIENT
Start: 2018-04-13 | End: 2018-05-09

## 2018-04-13 RX ADMIN — SODIUM CHLORIDE, POTASSIUM CHLORIDE, SODIUM LACTATE AND CALCIUM CHLORIDE: 600; 310; 30; 20 INJECTION, SOLUTION INTRAVENOUS at 06:18

## 2018-04-13 RX ADMIN — CEFTRIAXONE SODIUM 1 G: 1 INJECTION, POWDER, FOR SOLUTION INTRAMUSCULAR; INTRAVENOUS at 12:05

## 2018-04-13 RX ADMIN — RANITIDINE 150 MG: 150 TABLET, FILM COATED ORAL at 08:13

## 2018-04-13 NOTE — PLAN OF CARE
Problem: Patient Care Overview  Goal: Plan of Care/Patient Progress Review  Outcome: Adequate for Discharge Date Met: 04/13/18  Denies pain. Voiding clear rachelle urine in large amounts. No stones noted after straining urine. Rocephin given as ordered. VSS. Doptones 145. Patient denies contractions, bleeding and leaking/SROM. Discharged home with written and verbal instructions. Escorted to hospital enterence via W/C, accompanied by her father.

## 2018-04-13 NOTE — PLAN OF CARE
Problem: Patient Care Overview  Goal: Plan of Care/Patient Progress Review  Outcome: Improving  Pyelonephritis Note  Data:  Patients' VS WNL overnight             Tolerating oral pain medications with pain level adequately controlled: Yes however, has not had pain medication since 2049 on 4/12 and did not offer any complaints of pain overnight.             Tolerating oral liquids: Yes.   Afebrile.   Contraction pattern: denies contractions at this time. Fetal assessment: cem 145, will do NST before DC later today  Interventions:  Encourage oral fluids.      Plan:  Continue antibiotics- 1 dose of IV rocephin at noon today and plan to send home on oral antibiotics.  Pain meds as needed.

## 2018-04-13 NOTE — PROVIDER NOTIFICATION
04/13/18 0620   Provider Notification   Provider Name/Title EILEEN Sam   Method of Notification Phone   Request Evaluate - Remote   Notification Reason Status Update   Clarifying FHT order- Per CNM, OK to do dop tones this morning- before patient DC's later today, would like to have an NST done, for 0600 morning monitoring, OK to do just doptones. Patient reports no contractions at this time.

## 2018-04-13 NOTE — DISCHARGE SUMMARY
Keysha MILLAN is a 27 year old female,  ,        Patient's last menstrual period was 2017.. Estimated Date of Delivery: 2018 is calculated from lmp and verified with U/S    [facility] for evaluation of severe right flank pain and cherry colored urine.    HPI Pt presented last evening with c/o colicky right sided flank pain and hematuria.  Pt was admitted for observation overnight, given IV hydration, IV abx for suspected pyelonephritis and IV pain medication, high suspicion for kidney stone.    Pt did well overnight, the pain resolving, last dose of percocet approx 2000 on 2018, and resolution of cherry colored urine back to a pale yellow in color.  Pt received 2 doses of IV rocephin 24 hours aprt.    Denies previous hx of kidney stone or pyelonephritis      PRENATAL COURSE  Prenatal course was   complicated by    Patient Active Problem List    Diagnosis Date Noted     Encounter for triage in pregnant patient 2018     Priority: Medium     Kidney stone complicating pregnancy 2018     Priority: Medium     Supervision of other normal pregnancy, antepartum 2018     Priority: Medium     MFM survey are within normal limits but unable to see baby well r/t habitus. ultrasound scheduled in 4 wks to reassess.   NEeds early GCT.   Hx of primary C/s and Successful .       Migraine 01/15/2018     Priority: Medium     Overview:   Occasional, none in pregnancy       Need for Tdap vaccination 01/15/2018     Priority: Medium     Obesity affecting pregnancy, antepartum 2017     Priority: Medium      delivery delivered 05/10/2014     Priority: Medium     GBS carrier 05/10/2014     Priority: Medium     Preeclampsia 05/10/2014     Priority: Medium     Hx of gestational HTN with first baby and pre-eclampsia with Tx with Mag postpartum. Taking baby aspirin with this pregnancy.        PCOS (polycystic ovarian syndrome) 2014      "Priority: Medium       HISTORIES  No Known Allergies  Past Medical History:   Diagnosis Date     NO ACTIVE PROBLEMS      Past Surgical History:   Procedure Laterality Date      SECTION  2014     No family history on file.  Social History   Substance Use Topics     Smoking status: Never Smoker     Smokeless tobacco: Never Used     Alcohol use Yes      Comment: Ocass       LABS:       Lab Results   Component Value Date    ABO O 2018       Lab Results   Component Value Date    RH Pos 2018     Rhogam not indicated  Rubella immune   Treponema Pallidum Antibody  Negative    HIV    Non-reactive   Lab Results   Component Value Date    HGB 10.9 2018      No results found for: HEPBANG  Lab Results   Component Value Date    GBS Negative 2018     other     ROS  C: NEGATIVE for fever, chills, change in weight  I: NEGATIVE for worrisome rashes, moles or lesions  E/M: NEGATIVE for ear, mouth and throat problems  R: NEGATIVE for significant cough or SOB  CV: NEGATIVE for chest pain, palpitations or peripheral edema  GI: NEGATIVE for nausea, abdominal pain, heartburn, or change in bowel habits  : NEGATIVE for frequency, dysuria, or hematuria  PSYCHIATRIC:  NEGATIVE for depression, anxiety or other mental health concerns      PHYSICAL EXAM:  /70  Pulse 102  Temp 99  F (37.2  C)  Resp 20  Ht 1.575 m (5' 2\")  Wt 131.5 kg (290 lb)  LMP 2017  SpO2 94%  BMI 53.04 kg/m2  General appearance healthy, alert, active and no distress  Neuro:  denies headache and visual disturbances  Psych: Mentation normal and bright   Legs: 2+/2+, no clonus, no edema         ASSESSMENT/PLAN:  IUP @ 28w6d  wks gestation in observation overnight for probable kidney stone/UTI   Now stable   Pyelonephritis unlikely as pt was never febrile, and now symptoms resolved.  Pt has been treated with IV abx x 24 hrs and will plan a treatment course of oral abx x 10 days  Will plan to run UA/UC at next prenatal " course to be sure UTI has cleared  Call or RTC if symptoms reoccur  Consult with Dr Boone and she agrees with plan    Merry Pabon CNM

## 2018-04-13 NOTE — DISCHARGE INSTRUCTIONS
Discharge Instruction for Undelivered Patients      You were seen for: flank pain and hematuria  We Consulted: Chanelle Shetty CNM and Ilda Pabon CNM  You had (Test or Medicine): labs, pain medication and     Diet:   Drink 8 to 12 glasses of liquids (milk, juice, water) every day.  You may eat meals and snacks.     Activity:  Call your doctor or nurse midwife if your baby is moving less than usual.     Call your provider if you notice:  Swelling in your face or increased swelling in your hands or legs.  Headaches that are not relieved by Tylenol (acetaminophen).  Changes in your vision (blurring: seeing spots or stars.)  Nausea (sick to your stomach) and vomiting (throwing up).   Weight gain of 5 pounds or more per week.  Heartburn that doesn't go away.  Signs of bladder infection: pain when you urinate (use the toilet), need to go more often and more urgently.  The bag of marsh (rupture of membranes) breaks, or you notice leaking in your underwear.  Bright red blood in your underwear.  Abdominal (lower belly) or stomach pain.  For first baby: Contractions (tightening) less than 5 minutes apart for one hour or more.  Second (plus) baby: Contractions (tightening) less than 10 minutes apart and getting stronger.  *If less than 34 weeks: Contractions (tightenings) more than 6 times in one hour.  Increase or change in vaginal discharge (note the color and amount)      Follow-up: as instructed by your midwife

## 2018-04-13 NOTE — PROGRESS NOTES
"S: Still requesting percocet q4 hours. Denies nausea and has tolerated food well. Reports continued colicky pain on RLQ however hematuria is improving and pain is slightly improved from earlier      O: /69  Pulse 102  Temp 99.4  F (37.4  C) (Oral)  Ht 1.575 m (5' 2\")  Wt 131.5 kg (290 lb)  LMP 09/23/2017  SpO2 94%  BMI 53.04 kg/m2    Abdomen: mildly tender to palpation on right side.     Urine: yellow appearing now. No evidence of stone strainer, however mucousy sediment present.   IV infusing at 125cc    A: Suspected Kidney stone   UTI, at risk for pyelo  Afebrile    P: Keep on observation overnight with plan to dc home tomorrow after second dose of rocephin. Plan to DC home with course of oral macrobid. May DC home with pain medication should she still have pain tomorrow.     Chanelle Sam CNM   "

## 2018-04-13 NOTE — PLAN OF CARE
Problem: Patient Care Overview  Goal: Plan of Care/Patient Progress Review  Outcome: Improving  VSS. Patient reports continuous dull cramping and discomfort in right flank, abdomen, and radiating to groin. Decrease in pain after administration of percocet. Patient reports appropriate pain management at this time. LR running at 125 ml/hr for hydration. Adequate I&O's. Tolerated eating dinner this evening with no complains of nausea. Hematuria has subsided - now clear, dark yellow with sediment. No stones noted upon straining. Discussed with EILEEN Sam fetal and uterine monitoring - plan for TID doptones and patient report of contractions. Denies LOF, VB, ctxs. FM+. Vistaril given for sleep aid and  at bedside for support. Will continue to monitor and update provider as needed.

## 2018-04-14 LAB
BACTERIA SPEC CULT: ABNORMAL
SPECIMEN SOURCE: ABNORMAL

## 2018-04-14 NOTE — PROGRESS NOTES
Dear Keysha,    Your test results are attached below. The culture of your urine showed a bacteria infection caused by e coli. This is the most common type of urinary tract infection. The culture also shows that the Nitrofurantoin (aka Macrobid) that you are taking should work well to cure the infection. I hope that you are feeling much much better!  If you have any questions, please contact me via Tumblr or you can call our office at [phone].    Chanelle Davis CNM, ANNETTEM

## 2018-04-18 ENCOUNTER — PRENATAL OFFICE VISIT (OUTPATIENT)
Dept: OBGYN | Facility: CLINIC | Age: 28
End: 2018-04-18
Payer: COMMERCIAL

## 2018-04-18 VITALS
HEART RATE: 119 BPM | SYSTOLIC BLOOD PRESSURE: 135 MMHG | DIASTOLIC BLOOD PRESSURE: 88 MMHG | WEIGHT: 287.4 LBS | BODY MASS INDEX: 52.57 KG/M2

## 2018-04-18 DIAGNOSIS — O34.219 PREVIOUS CESAREAN DELIVERY, ANTEPARTUM CONDITION OR COMPLICATION: Primary | ICD-10-CM

## 2018-04-18 DIAGNOSIS — Z23 NEED FOR TDAP VACCINATION: ICD-10-CM

## 2018-04-18 DIAGNOSIS — Z34.83 ENCOUNTER FOR SUPERVISION OF OTHER NORMAL PREGNANCY IN THIRD TRIMESTER: ICD-10-CM

## 2018-04-18 LAB
GLUCOSE 1H P 50 G GLC PO SERPL-MCNC: 94 MG/DL (ref 60–129)
HGB BLD-MCNC: 11 G/DL (ref 11.7–15.7)

## 2018-04-18 PROCEDURE — 90715 TDAP VACCINE 7 YRS/> IM: CPT | Performed by: OBSTETRICS & GYNECOLOGY

## 2018-04-18 PROCEDURE — 90471 IMMUNIZATION ADMIN: CPT | Performed by: OBSTETRICS & GYNECOLOGY

## 2018-04-18 PROCEDURE — 82950 GLUCOSE TEST: CPT | Performed by: OBSTETRICS & GYNECOLOGY

## 2018-04-18 PROCEDURE — 00000218 ZZHCL STATISTIC OBHBG - HEMOGLOBIN: Performed by: OBSTETRICS & GYNECOLOGY

## 2018-04-18 PROCEDURE — 99207 ZZC PRENATAL VISIT: CPT | Performed by: OBSTETRICS & GYNECOLOGY

## 2018-04-18 PROCEDURE — 36415 COLL VENOUS BLD VENIPUNCTURE: CPT | Performed by: OBSTETRICS & GYNECOLOGY

## 2018-04-18 NOTE — MR AVS SNAPSHOT
After Visit Summary   2018    Keysha MILLAN    MRN: [id number]           Patient Information     Date Of Birth          1990        Visit Information        Provider Department      2018 2:45 PM Merry Lopze MD [facility]        Today's Diagnoses     Previous  delivery, antepartum condition or complication    -  1    Encounter for supervision of other normal pregnancy in third trimester           Follow-ups after your visit        Your next 10 appointments already scheduled     2018  2:45 PM CDT   ESTABLISHED PRENATAL with Merry Lopez MD   [facility] ([facility])    [address] [place] [id number]   [phone]            May 09, 2018 11:30 AM CDT   (Arrive by 11:15 AM)   [facility]    [address] [place] [address]   [phone]            May 23, 2018  2:00 PM CDT   ESTABLISHED PRENATAL with ALIX Ramsey CNM   [facility] ([facility])    [address] [place] [id number]   [phone]              Who to contact     If you have questions or need follow up information about today's clinic visit or your schedule please contact [facility] directly at [phone].  Normal or non-critical lab and imaging results will be communicated to you by MyChart, letter or phone within 4 business days after the clinic has received the results. If you do not hear from us within 7 days, please contact the clinic through MyChart or phone. If you have a critical or abnormal lab result, we will notify you by phone as soon as possible.  Submit refill requests through UUCUN or call your pharmacy and they will forward the refill request to us. Please allow 3 business days for your refill to be  completed.          Additional Information About Your Visit        ThoughtBoxhart Information     [employer] gives you secure access to your electronic health record. If you see a primary care provider, you can also send messages to your care team and make appointments. If you have questions, please call your primary care clinic.  If you do not have a primary care provider, please call [phone] and they will assist you.        Care EveryWhere ID     This is your Care EveryWhere ID. This could be used by other organizations to access your [place] medical records  [id number]        Your Vitals Were     Pulse Last Period BMI (Body Mass Index)             119 09/23/2017 52.57 kg/m2          Blood Pressure from Last 3 Encounters:   04/18/18 135/88   04/13/18 122/70   03/01/18 126/82    Weight from Last 3 Encounters:   04/18/18 287 lb 6.4 oz (130.4 kg)   04/12/18 290 lb (131.5 kg)   03/01/18 286 lb (129.7 kg)              We Performed the Following     Glucose tolerance gest screen 1 hour     OB hemoglobin        Primary Care Provider Fax #    Physician No Ref-Primary [phone]       No address on file        Equal Access to Services     CHHAYA RAINES : Hadii ayla Alarcon, deon cuevas, soniya villanueva, dar powell . So [facility] [phone].    ATENCIÓN: Si habla español, tiene a hughes disposición servicios gratuitos de asistencia lingüística. Llame al [phone].    We comply with applicable federal civil rights laws and [place] laws. We do not discriminate on the basis of race, color, national origin, age, disability, sex, sexual orientation, or gender identity.            Thank you!     Thank you for choosing [facility]  for your care. Our goal is always to provide you with excellent care. Hearing back from our patients is one way we can continue to improve our services. Please take a few minutes to complete the written survey that you may receive in the  mail after your visit with us. Thank you!             Your Updated Medication List - Protect others around you: Learn how to safely use, store and throw away your medicines at www.disposemymeds.org.          This list is accurate as of 4/18/18  2:34 PM.  Always use your most recent med list.                   Brand Name Dispense Instructions for use Diagnosis    ASPIRIN PO      Take 81 mg by mouth daily        breast pump Misc     1 each    1 each as needed        nitroFURantoin (macrocrystal-monohydrate) 100 MG capsule    MACROBID    20 capsule    Take 1 capsule (100 mg) by mouth 2 times daily    Dysuria       Prenatal Vitamins 28-0.8 MG Tabs      1 Tab daily. chewables

## 2018-04-18 NOTE — PROGRESS NOTES
29w4d CNM patient here to discuss TOLAC.  She reports feeling ok, taking macrobid for UTI after diagnosed with kidney stone an UTI last week.  It has made her fatigued and overall not feeling great.  She denies any more pain from the stone (passed in hospital most likely), no dysuria or hematuria.  Since almost done, will complete course.  glucola and tdap today.    Risks and benefits of trial of labor after  section versus elective repeat  section were explained. The discussion included risk 1% of uterine rupture with a 0.5% chance of subsequent risk of fetal death or brain damage or significant maternal morbidity or death. I also explained risks of bleeding, infection, and potential damage to other organs associated with a repeat . We discussed that given her size, an emergency c/s may be more difficult and time it takes to delivery baby may be delayed, potentially leading to more significant complications for both her and baby.  She verbalizes understanding. If a trial of labor is chosen, we discussed the need for continuous monitoring and IV access in active labor.  We also discussed the contraindications to induction and the need  to have repeat  if one were to become post dates/41 weeks without labor.  All questions were answered.  She has had a successful  of 8.5lb male.  She is good candidate for TOLAC and appropriate for [facility] care.  TOLAC consent signed.  RTC 2 weeks  toby

## 2018-04-21 ENCOUNTER — NURSE TRIAGE (OUTPATIENT)
Dept: NURSING | Facility: CLINIC | Age: 28
End: 2018-04-21

## 2018-04-21 ENCOUNTER — TELEPHONE (OUTPATIENT)
Dept: OBGYN | Facility: CLINIC | Age: 28
End: 2018-04-21

## 2018-04-21 DIAGNOSIS — R11.0 NAUSEA: Primary | ICD-10-CM

## 2018-04-21 RX ORDER — ONDANSETRON 4 MG/1
4 TABLET, FILM COATED ORAL EVERY 8 HOURS PRN
Qty: 18 TABLET | Refills: 0 | Status: SHIPPED | OUTPATIENT
Start: 2018-04-21 | End: 2018-06-13

## 2018-04-21 NOTE — TELEPHONE ENCOUNTER
"  Reason for Disposition    Patient sounds very sick or weak to the triager     \"I was seen in ER on  for kidney stones and a UTI (see epic). In the past 24-48 hours I am not able to eat or keep much down. I have vomited 4 times. I have loose stools. I also have flank/side pain and lower abdominal cramping. \" Denies fever, denies blood in urine or burning, frequency while urinating. Baby active, denies contractions or leakage of fluids at this time. \"The last time I went in with this pain my urine was bright red, it's normal color now but I really don't feel well.\" Paged on call midwife Leanne Martínez ([facility])at 8:40 am to call Keysha at [phone].    Additional Information    Negative: Passed out (i.e., lost consciousness, collapsed and was not responding)    Negative: Shock suspected (e.g., cold/pale/clammy skin, too weak to stand, low BP, rapid pulse)    Negative: Difficult to awaken or acting confused  (e.g., disoriented, slurred speech)    Negative: [1] SEVERE abdominal pain (e.g., excruciating) AND [2] constant AND [3] present > 1 hour    Negative: SEVERE vaginal bleeding (e.g., continuous red blood from vagina, or large blood clots)    Negative: Sounds like a life-threatening emergency to the triager    Negative: Followed an abdomen (stomach) injury    Negative: [1] Having contractions or other symptoms of labor AND [2] >= 37 weeks pregnant (i.e., term pregnancy)    Negative: [1] Having contractions or other symptoms of labor AND [2] < 37 weeks pregnant (i.e., )    Negative: [1] Abdominal pain AND [2] pregnant < 20 weeks    Negative: [1] Vomiting AND [2] contains red blood or black (\"coffee ground\") material  (Exception: few red streaks in vomit that only happened once)    Negative: MODERATE-SEVERE abdominal pain (e.g., interferes with normal activities, awakens from sleep)    Negative: Vaginal bleeding or spotting    Negative: [1] Baby moving less today (e.g., kick count < 5 in 1 hour or < " 10 in 2 hours) AND [2] pregnant 23 or more weeks    Negative: Leakage of fluid from vagina    Negative: New hand or face swelling    Negative: Blurred vision or visual changes    Negative: [1] SEVERE headache AND [2] not relieved with acetaminophen (e.g., Tylenol)    Negative: [1] MILD abdominal pain (e.g., doesn't interfere with normal activities) AND [2] constant AND [3] present > 2 hours    Negative: [1] Intermittent lower abdominal pain AND [2] present > 24 hours    Negative: Fever > 100.4 F (38.0 C)    Negative: Blood in urine (red, pink, or tea-colored)    Negative: White of the eyes have turned yellow (i.e., jaundice)    Protocols used: PREGNANCY - ABDOMINAL PAIN GREATER THAN 20 WEEKS EGA-ADULT-AH

## 2018-04-21 NOTE — TELEPHONE ENCOUNTER
Keysha called this morning with two days of nausea and vomiting. She reports it started Friday and has continued throughout today. She is able to drink some water and has been trying to eat food but is throwing up most of the food. She was diagnosed with a urine infection and kidney stone 9 days ago. She has one day left of her macrobid which she has been taking. The symptoms she presented with for her kidney stone and UTI have resolved. She reported significant right sided pain with blood in her urine. She no longer has flank pain and does not have any urinary symptoms. She reports feeling overall bad with fever/chills, and all around body aches with back pain and abdominal cramping. No fevers. Offered her to come in for evaluation and fluids. She reports she does not need fluids and she is drinking some and peeing normally. She is requesting to have Zofran sent to her pharmacy so she can pick that up. Zofran sent. Discussed with patient if she is not feeling better she needs to come in today for evaluation to make sure her urine infection is not progressing to a kidney infection. She agrees to come in this afternoon if she is not feeling better. She has no other questions or concerns today. Leanne Martínez CNM

## 2018-05-02 ENCOUNTER — HOSPITAL ENCOUNTER (OUTPATIENT)
Dept: ULTRASOUND IMAGING | Facility: CLINIC | Age: 28
Discharge: HOME | End: 2018-05-02
Attending: OBSTETRICS & GYNECOLOGY | Admitting: OBSTETRICS & GYNECOLOGY
Payer: COMMERCIAL

## 2018-05-02 ENCOUNTER — OFFICE VISIT (OUTPATIENT)
Dept: MATERNAL FETAL MEDICINE | Facility: CLINIC | Age: 28
End: 2018-05-02
Attending: OBSTETRICS & GYNECOLOGY
Payer: COMMERCIAL

## 2018-05-02 DIAGNOSIS — O99.210 OBESITY AFFECTING PREGNANCY, ANTEPARTUM: Primary | ICD-10-CM

## 2018-05-02 DIAGNOSIS — O99.210 OBESITY IN PREGNANCY, ANTEPARTUM: ICD-10-CM

## 2018-05-02 PROCEDURE — 76816 OB US FOLLOW-UP PER FETUS: CPT

## 2018-05-02 NOTE — PROGRESS NOTES
"Please see \"Imaging\" tab under \"Chart Review\" for details of today's ultrasound.    Chris Krause M.D.  Specialist in Maternal-Fetal Medicine     "

## 2018-05-02 NOTE — MR AVS SNAPSHOT
After Visit Summary   5/2/2018    Keysha Dixon CARO    MRN: [id number]           Patient Information     Date Of Birth          1990        Visit Information        Provider Department      5/2/2018 12:15 PM Chris Krause MD [facility] Maternal Fetal Medicine [facility]        Today's Diagnoses     Obesity affecting pregnancy, antepartum    -  1       Follow-ups after your visit        Your next 10 appointments already scheduled     May 09, 2018 11:30 AM CDT   (Arrive by 11:15 AM)   ESTABLISHED PRENATAL with ALIX Jones CNM   [facility] ([facility])    [address] [place] [address] [address]  [facility] [id number] [phone]            May 23, 2018  2:00 PM CDT   ESTABLISHED PRENATAL with ALIX Ramsey CNM   [facility] ([facility])    [address] [place] [id number] [phone]              Future tests that were ordered for you today     Open Future Orders        Priority Expected Expires Ordered    [facility] Comprehensive Single F/U Routine  5/2/2019 5/2/2018            Who to contact     If you have questions or need follow up information about today's clinic visit or your schedule please contact [facility] MATERNAL FETAL MEDICINE [facility] directly at [phone].  Normal or non-critical lab and imaging results will be communicated to you by MyChart, letter or phone within 4 business days after the clinic has received the results. If you do not hear from us within 7 days, please contact the clinic through MyChart or phone. If you have a critical or abnormal lab result, we will notify you by phone as soon as possible.  Submit refill requests through On[employer] or call your pharmacy and they will forward the refill request to us. Please allow 3 business days for your refill to be completed.          Additional Information About Your Visit        MyChart Information     [facility]  gives you secure access to your electronic health record. If you see a primary care provider, you can also send messages to your care team and make appointments. If you have questions, please call your primary care clinic.  If you do not have a primary care provider, please call [phone] and they will assist you.        Care EveryWhere ID     This is your Care EveryWhere ID. This could be used by other organizations to access your [place] medical records  [id number]        Your Vitals Were     Last Period                   09/23/2017            Blood Pressure from Last 3 Encounters:   04/18/18 135/88   04/13/18 122/70   03/01/18 126/82    Weight from Last 3 Encounters:   04/18/18 130.4 kg (287 lb 6.4 oz)   04/12/18 131.5 kg (290 lb)   03/01/18 129.7 kg (286 lb)               Primary Care Provider Fax #    Physician No Ref-Primary [phone]       No address on file        Equal Access to Services     CHHAYA RAINES AH: Hadii ayla pro hadasho Soomaali, waaxda luqadaha, qaybta kaalmada adeegyada, dar powell . So [facility] [phone].    ATENCIÓN: Si habla español, tiene a hughes disposición servicios gratuitos de asistencia lingüística. Llame al [phone].    We comply with applicable federal civil rights laws and [place] laws. We do not discriminate on the basis of race, color, national origin, age, disability, sex, sexual orientation, or gender identity.            Thank you!     Thank you for choosing [facility]  for your care. Our goal is always to provide you with excellent care. Hearing back from our patients is one way we can continue to improve our services. Please take a few minutes to complete the written survey that you may receive in the mail after your visit with us. Thank you!             Your Updated Medication List - Protect others around you: Learn how to safely use, store and throw away your medicines at www.disposemymeds.org.           This list is accurate as of 5/2/18 12:50 PM.  Always use your most recent med list.                   Brand Name Dispense Instructions for use Diagnosis    ASPIRIN PO      Take 81 mg by mouth daily        breast pump Misc     1 each    1 each as needed        nitroFURantoin (macrocrystal-monohydrate) 100 MG capsule    MACROBID    20 capsule    Take 1 capsule (100 mg) by mouth 2 times daily    Dysuria       ondansetron 4 MG tablet    ZOFRAN    18 tablet    Take 1 tablet (4 mg) by mouth every 8 hours as needed for nausea    Nausea       Prenatal Vitamins 28-0.8 MG Tabs      1 Tab daily. chewables

## 2018-05-09 ENCOUNTER — PRENATAL OFFICE VISIT (OUTPATIENT)
Dept: MIDWIFE SERVICES | Facility: CLINIC | Age: 28
End: 2018-05-09
Payer: COMMERCIAL

## 2018-05-09 VITALS — SYSTOLIC BLOOD PRESSURE: 136 MMHG | WEIGHT: 285.5 LBS | BODY MASS INDEX: 52.22 KG/M2 | DIASTOLIC BLOOD PRESSURE: 72 MMHG

## 2018-05-09 DIAGNOSIS — O99.891 CALCULUS OF KIDNEY AFFECTING PREGNANCY IN THIRD TRIMESTER: ICD-10-CM

## 2018-05-09 DIAGNOSIS — Z34.80 SUPERVISION OF OTHER NORMAL PREGNANCY, ANTEPARTUM: Primary | ICD-10-CM

## 2018-05-09 DIAGNOSIS — N20.0 CALCULUS OF KIDNEY AFFECTING PREGNANCY IN THIRD TRIMESTER: ICD-10-CM

## 2018-05-09 LAB
ALBUMIN UR-MCNC: NEGATIVE MG/DL
APPEARANCE UR: CLEAR
BILIRUB UR QL STRIP: NEGATIVE
COLOR UR AUTO: YELLOW
GLUCOSE UR STRIP-MCNC: NEGATIVE MG/DL
HGB UR QL STRIP: NEGATIVE
KETONES UR STRIP-MCNC: NEGATIVE MG/DL
LEUKOCYTE ESTERASE UR QL STRIP: NEGATIVE
NITRATE UR QL: NEGATIVE
PH UR STRIP: 6.5 PH (ref 5–7)
SOURCE: NORMAL
SP GR UR STRIP: <=1.005 (ref 1–1.03)
UROBILINOGEN UR STRIP-ACNC: 0.2 EU/DL (ref 0.2–1)

## 2018-05-09 PROCEDURE — 87086 URINE CULTURE/COLONY COUNT: CPT | Performed by: ADVANCED PRACTICE MIDWIFE

## 2018-05-09 PROCEDURE — 81003 URINALYSIS AUTO W/O SCOPE: CPT | Performed by: ADVANCED PRACTICE MIDWIFE

## 2018-05-09 PROCEDURE — 99207 ZZC PRENATAL VISIT: CPT | Performed by: ADVANCED PRACTICE MIDWIFE

## 2018-05-09 NOTE — MR AVS SNAPSHOT
After Visit Summary   5/9/2018    Keysha Dixon Caro    MRN: [id number]           Patient Information     Date Of Birth          1990        Visit Information        Provider Department      5/9/2018 11:30 AM Merry Pabon APRN CNM [facility]        Today's Diagnoses     Supervision of other normal pregnancy, antepartum    -  1    Calculus of kidney affecting pregnancy in third trimester           Follow-ups after your visit        Your next 10 appointments already scheduled     May 23, 2018  2:00 PM CDT   ESTABLISHED PRENATAL with ALIX Ramsey CNM   [facility] ([facility])    [address] [facility] [address]  [facility] [address] [phone]            May 30, 2018 11:45 AM CDT   MFM US COMPRE SINGLE F/U with [id number]   [facility])    [address]  [facility] [id number] [phone]           Wear comfortable clothes and leave your valuables at home.            May 30, 2018 12:15 PM CDT   Radiology MD with UR FLOYD ALCAZAR   [facility])    [address]  [facility] [address]   [phone]           Please arrive at the time given for your first appointment. This visit is used internally to schedule the physician's time during your ultrasound.              Who to contact     If you have questions or need follow up information about today's clinic visit or your schedule please contact [facility] directly at [phone].  Normal or non-critical lab and imaging results will be communicated to you by MyChart, letter or phone within 4 business days after the clinic has received the results. If you do not hear from us within 7 days, please contact the clinic through MyChart or phone. If you have a critical or abnormal lab  result, we will notify you by phone as soon as possible.  Submit refill requests through Sweeten or call your pharmacy and they will forward the refill request to us. Please allow 3 business days for your refill to be completed.          Additional Information About Your Visit        Vend[employer] Information     Sweeten gives you secure access to your electronic health record. If you see a primary care provider, you can also send messages to your care team and make appointments. If you have questions, please call your primary care clinic.  If you do not have a primary care provider, please call [phone] and they will assist you.        Care EveryWhere ID     This is your Care EveryWhere ID. This could be used by other organizations to access your [place] medical records  [id number]        Your Vitals Were     Last Period BMI (Body Mass Index)                09/23/2017 52.22 kg/m2           Blood Pressure from Last 3 Encounters:   05/09/18 136/72   04/18/18 135/88   04/13/18 122/70    Weight from Last 3 Encounters:   05/09/18 285 lb 8 oz (129.5 kg)   04/18/18 287 lb 6.4 oz (130.4 kg)   04/12/18 290 lb (131.5 kg)              We Performed the Following     *UA reflex to Microscopic and Culture ([place] and [facility] (except [place] and [place])     Urine Culture Aerobic Bacterial          Today's Medication Changes          These changes are accurate as of 5/9/18 12:14 PM.  If you have any questions, ask your nurse or doctor.               Stop taking these medicines if you haven't already. Please contact your care team if you have questions.     nitroFURantoin (macrocrystal-monohydrate) 100 MG capsule   Commonly known as:  MACROBID   Stopped by:  Merry Pabon APRN CNM                    Primary Care Provider Fax #    Physician No Ref-Primary [phone]       No address on file        Equal Access to Services     CHHAYA RAINES : Jd Alarcon, deon cuevas, soniya faulkner  dar villanuevatriston yuemaryann walkeraan ah. [facility] [phone].    ATENCIÓN: Si habla cruz, tiene a hughes disposición servicios gratuitos de asistencia lingüística. Bushra al [phone].    We comply with applicable federal civil rights laws and [place] laws. We do not discriminate on the basis of race, color, national origin, age, disability, sex, sexual orientation, or gender identity.            Thank you!     Thank you for choosing [facility]  for your care. Our goal is always to provide you with excellent care. Hearing back from our patients is one way we can continue to improve our services. Please take a few minutes to complete the written survey that you may receive in the mail after your visit with us. Thank you!             Your Updated Medication List - Protect others around you: Learn how to safely use, store and throw away your medicines at www.disposemymeds.org.          This list is accurate as of 5/9/18 12:14 PM.  Always use your most recent med list.                   Brand Name Dispense Instructions for use Diagnosis    ASPIRIN PO      Take 81 mg by mouth daily        breast pump Misc     1 each    1 each as needed        FLONASE NA           ondansetron 4 MG tablet    ZOFRAN    18 tablet    Take 1 tablet (4 mg) by mouth every 8 hours as needed for nausea    Nausea       Prenatal Vitamins 28-0.8 MG Tabs      1 Tab daily. chewables

## 2018-05-09 NOTE — PROGRESS NOTES
Feeling well,  Has growth U/S in 3rd trimester.  UC today as follow up to hospital stay.  RTC 2 wks

## 2018-05-10 LAB
BACTERIA SPEC CULT: NORMAL
SPECIMEN SOURCE: NORMAL

## 2018-05-23 ENCOUNTER — PRENATAL OFFICE VISIT (OUTPATIENT)
Dept: MIDWIFE SERVICES | Facility: CLINIC | Age: 28
End: 2018-05-23
Payer: COMMERCIAL

## 2018-05-23 VITALS — DIASTOLIC BLOOD PRESSURE: 72 MMHG | BODY MASS INDEX: 52.13 KG/M2 | SYSTOLIC BLOOD PRESSURE: 118 MMHG | WEIGHT: 285 LBS

## 2018-05-23 DIAGNOSIS — Z34.83 ENCOUNTER FOR SUPERVISION OF OTHER NORMAL PREGNANCY IN THIRD TRIMESTER: Primary | ICD-10-CM

## 2018-05-23 PROCEDURE — 99207 ZZC PRENATAL VISIT: CPT | Performed by: ADVANCED PRACTICE MIDWIFE

## 2018-05-23 NOTE — PROGRESS NOTES
34w4d  Patient feeling well. Positive fetal movement. Denies water leaking, vaginal bleeding, decreased fetal movement, contraction pain, or headaches.   Doing well. No concerns or questions today. Feeling good movements, wondering what position the baby is in. Feels cephalic but difficult to fully assess. She has a growth scheduled for next week.   Danger signs reviewed, pre-eclampsia signs and symptoms discussed.   Knows when to call triage and has phone numbers.   Follow up in 2 weeks, GBS and HGB next visit.   Leanne Martínez CNM

## 2018-05-23 NOTE — MR AVS SNAPSHOT
After Visit Summary   5/23/2018    Keysha Dixon Caro    MRN: [id number]           Patient Information     Date Of Birth          1990        Visit Information        Provider Department      5/23/2018 2:00 PM Leanne Martínez APRN CNM [facility]        Today's Diagnoses     Encounter for supervision of other normal pregnancy in third trimester    -  1       Follow-ups after your visit        Your next 10 appointments already scheduled     May 30, 2018 11:45 AM CDT   MFM US COMPRE SINGLE F/U with [id number]   [facility] Maternal Fetal Medicine Ultrasound - [facility])    [address]  [facility] [address] [phone]           Wear comfortable clothes and leave your valuables at home.            May 30, 2018 12:15 PM CDT   Radiology MD with UR FLOYD ALCAZAR   [facility] Maternal Fetal Medicine - [facility])    [address]  [facility] [address]   [phone]           Please arrive at the time given for your first appointment. This visit is used internally to schedule the physician's time during your ultrasound.            Jun 06, 2018  2:00 PM CDT   ESTABLISHED PRENATAL with Wedny Best CNM   [facility] ([facility])    [address] [facility]  [address]  [facility] [address]   [phone]            Jun 13, 2018  2:00 PM CDT   ESTABLISHED PRENATAL with ALIX Amaral CNM   [facility] ([facility])    [address]  [address]  [facility] [address]   [phone]            Jun 20, 2018  2:00 PM CDT   ESTABLISHED PRENATAL with ALIX Ramsey CNM   [facility] ([facility])    [address]  [address]  [facility] [address]   [phone]            Jun 27, 2018  2:00 PM CDT   ESTABLISHED PRENATAL with Leanne Sommer  ALIX Martínez CNM   [facility] ([facility])    [address] [place] [address] [phone]              Who to contact     If you have questions or need follow up information about today's clinic visit or your schedule please contact [facility] directly at [phone].  Normal or non-critical lab and imaging results will be communicated to you by MyChart, letter or phone within 4 business days after the clinic has received the results. If you do not hear from us within 7 days, please contact the clinic through [employer]ing[employer] or phone. If you have a critical or abnormal lab result, we will notify you by phone as soon as possible.  Submit refill requests through [employer] or call your pharmacy and they will forward the refill request to us. Please allow 3 business days for your refill to be completed.          Additional Information About Your Visit        [employer] Information     [employer] gives you secure access to your electronic health record. If you see a primary care provider, you can also send messages to your care team and make appointments. If you have questions, please call your primary care clinic.  If you do not have a primary care provider, please call [phone] and they will assist you.        Care EveryWhere ID     This is your Care EveryWhere ID. This could be used by other organizations to access your [place] medical records  [id number]        Your Vitals Were     Last Period BMI (Body Mass Index)                09/23/2017 52.13 kg/m2           Blood Pressure from Last 3 Encounters:   05/23/18 118/72   05/09/18 136/72   04/18/18 135/88    Weight from Last 3 Encounters:   05/23/18 285 lb (129.3 kg)   05/09/18 285 lb 8 oz (129.5 kg)   04/18/18 287 lb 6.4 oz (130.4 kg)              Today, you had the following     No orders found for display       Primary Care Provider Fax #    Physician No Ref-Primary [phone]       No address  on file        Equal Access to Services     GURINDERILEANA OLU : Hadii aad ku hadpamelascar Jyotsnaali, warosendoda luwillangelha, qachito fabienneartasia villanueva, dar torres. So [facility] [phone].    ATENCIÓN: Si habla español, tiene a hughes disposición servicios gratuitos de asistencia lingüística. Llame al [phone].    We comply with applicable federal civil rights laws and [place] laws. We do not discriminate on the basis of race, color, national origin, age, disability, sex, sexual orientation, or gender identity.            Thank you!     Thank you for choosing [facility]  for your care. Our goal is always to provide you with excellent care. Hearing back from our patients is one way we can continue to improve our services. Please take a few minutes to complete the written survey that you may receive in the mail after your visit with us. Thank you!             Your Updated Medication List - Protect others around you: Learn how to safely use, store and throw away your medicines at www.disposemymeds.org.          This list is accurate as of 5/23/18  2:23 PM.  Always use your most recent med list.                   Brand Name Dispense Instructions for use Diagnosis    ASPIRIN PO      Take 81 mg by mouth daily        breast pump Misc     1 each    1 each as needed        FLONASE NA           ondansetron 4 MG tablet    ZOFRAN    18 tablet    Take 1 tablet (4 mg) by mouth every 8 hours as needed for nausea    Nausea       Prenatal Vitamins 28-0.8 MG Tabs      1 Tab daily. chewables

## 2018-05-30 ENCOUNTER — HOSPITAL ENCOUNTER (OUTPATIENT)
Dept: ULTRASOUND IMAGING | Facility: CLINIC | Age: 28
Discharge: HOME | End: 2018-05-30
Attending: OBSTETRICS & GYNECOLOGY | Admitting: OBSTETRICS & GYNECOLOGY
Payer: COMMERCIAL

## 2018-05-30 ENCOUNTER — OFFICE VISIT (OUTPATIENT)
Dept: MATERNAL FETAL MEDICINE | Facility: CLINIC | Age: 28
End: 2018-05-30
Attending: OBSTETRICS & GYNECOLOGY
Payer: COMMERCIAL

## 2018-05-30 DIAGNOSIS — O99.213 MATERNAL OBESITY SYNDROME, ANTEPARTUM, THIRD TRIMESTER: Primary | ICD-10-CM

## 2018-05-30 DIAGNOSIS — O32.2XX0 TRANSVERSE LIE OF FETUS, SINGLE OR UNSPECIFIED FETUS: ICD-10-CM

## 2018-05-30 DIAGNOSIS — O99.210 OBESITY AFFECTING PREGNANCY, ANTEPARTUM: ICD-10-CM

## 2018-05-30 PROCEDURE — 76816 OB US FOLLOW-UP PER FETUS: CPT

## 2018-05-30 NOTE — PROGRESS NOTES
Please see full imaging report from ViewPoint program under imaging tab.    No MFM follow up scheduled at this time. However, recent MFM consensus recommended considering weekly BPPs or twice weekly NSTs for BMI > 40. Fetus is transverse lie.     Vishal Bruce MD  Maternal Fetal Medicine

## 2018-05-30 NOTE — MR AVS SNAPSHOT
After Visit Summary   5/30/2018    Keysha Dixon Caro    MRN: [id number]           Patient Information     Date Of Birth          1990        Visit Information        Provider Department      5/30/2018 12:15 PM Vishal Bruce MD [facility] Maternal Fetal Medicine [facility]        Today's Diagnoses     Maternal obesity syndrome, antepartum, third trimester    -  1    Transverse lie of fetus, single or unspecified fetus           Follow-ups after your visit        Your next 10 appointments already scheduled     Jun 06, 2018  2:00 PM CDT   ESTABLISHED PRENATAL with Wendy Best CNM   [facility] ([facility])    [address] [place] [address]  [address]  [facility] [address]   [phone]            Jun 13, 2018  2:00 PM CDT   ESTABLISHED PRENATAL with ALIX Amaral CNM   [facility] ([facility])    [address] [place] [address]  [address]  [facility] [address] [phone]            Jun 20, 2018  2:00 PM CDT   ESTABLISHED PRENATAL with ALIX Ramsey CNM   [facility] ([facility])    [address] [place] [address]  [address]  [facility] [address] [phone]            Jun 27, 2018  2:00 PM CDT   ESTABLISHED PRENATAL with ALIX Ramsey CNM   [facility] ([facility])    [address] [place] [address]  [address]  [facility] [address] [phone]              Who to contact     If you have questions or need follow up information about today's clinic visit or your schedule please contact [facility] directly at [phone].  Normal or non-critical lab and imaging results will be communicated to you by MyChart, letter or phone within 4 business days after the clinic has received the results. If you do not hear from us within 7 days, please contact the clinic through MyChart or phone. If you have a critical  or abnormal lab result, we will notify you by phone as soon as possible.  Submit refill requests through Engezni or call your pharmacy and they will forward the refill request to us. Please allow 3 business days for your refill to be completed.          Additional Information About Your Visit        Bet[employer]hart Information     [employer] gives you secure access to your electronic health record. If you see a primary care provider, you can also send messages to your care team and make appointments. If you have questions, please call your primary care clinic.  If you do not have a primary care provider, please call [phone] and they will assist you.        Care EveryWhere ID     This is your Care EveryWhere ID. This could be used by other organizations to access your [place] medical records  [id number]        Your Vitals Were     Last Period                   09/23/2017            Blood Pressure from Last 3 Encounters:   05/23/18 118/72   05/09/18 136/72   04/18/18 135/88    Weight from Last 3 Encounters:   05/23/18 129.3 kg (285 lb)   05/09/18 129.5 kg (285 lb 8 oz)   04/18/18 130.4 kg (287 lb 6.4 oz)              Today, you had the following     No orders found for display       Primary Care Provider Fax #    Physician No Ref-Primary [phone]       No address on file        Equal Access to Services     CHHAYA RAINES : Hadii aad ku hadasho Soomaali, waaxda luqadaha, qaybta kaalmada adeegyada, dar powell . So [facility] [phone].    ATENCIÓN: Si habla español, tiene a hughes disposición servicios gratuitos de asistencia lingüística. Llame al [phone].    We comply with applicable federal civil rights laws and [place] laws. We do not discriminate on the basis of race, color, national origin, age, disability, sex, sexual orientation, or gender identity.            Thank you!     Thank you for choosing [facility]  for your care. Our goal is always to  provide you with excellent care. Hearing back from our patients is one way we can continue to improve our services. Please take a few minutes to complete the written survey that you may receive in the mail after your visit with us. Thank you!             Your Updated Medication List - Protect others around you: Learn how to safely use, store and throw away your medicines at www.disposemymeds.org.          This list is accurate as of 5/30/18 12:24 PM.  Always use your most recent med list.                   Brand Name Dispense Instructions for use Diagnosis    ASPIRIN PO      Take 81 mg by mouth daily        breast pump Misc     1 each    1 each as needed        FLONASE NA           ondansetron 4 MG tablet    ZOFRAN    18 tablet    Take 1 tablet (4 mg) by mouth every 8 hours as needed for nausea    Nausea       Prenatal Vitamins 28-0.8 MG Tabs      1 Tab daily. chewables

## 2018-06-06 ENCOUNTER — PRENATAL OFFICE VISIT (OUTPATIENT)
Dept: MIDWIFE SERVICES | Facility: CLINIC | Age: 28
End: 2018-06-06
Payer: COMMERCIAL

## 2018-06-06 VITALS — BODY MASS INDEX: 51.4 KG/M2 | DIASTOLIC BLOOD PRESSURE: 78 MMHG | WEIGHT: 281 LBS | SYSTOLIC BLOOD PRESSURE: 118 MMHG

## 2018-06-06 DIAGNOSIS — Z34.80 SUPERVISION OF OTHER NORMAL PREGNANCY, ANTEPARTUM: ICD-10-CM

## 2018-06-06 DIAGNOSIS — Z98.891 H/O: C-SECTION: ICD-10-CM

## 2018-06-06 DIAGNOSIS — O32.9XX0 MALPRESENTATION BEFORE ONSET OF LABOR, SINGLE OR UNSPECIFIED FETUS: ICD-10-CM

## 2018-06-06 DIAGNOSIS — O99.210 OBESITY AFFECTING PREGNANCY, ANTEPARTUM: Primary | ICD-10-CM

## 2018-06-06 LAB — HGB BLD-MCNC: 10.5 G/DL (ref 11.7–15.7)

## 2018-06-06 PROCEDURE — 00000218 ZZHCL STATISTIC OBHBG - HEMOGLOBIN: Performed by: ADVANCED PRACTICE MIDWIFE

## 2018-06-06 PROCEDURE — 99207 ZZC PRENATAL VISIT: CPT | Performed by: ADVANCED PRACTICE MIDWIFE

## 2018-06-06 PROCEDURE — 36416 COLLJ CAPILLARY BLOOD SPEC: CPT | Performed by: ADVANCED PRACTICE MIDWIFE

## 2018-06-06 PROCEDURE — 87653 STREP B DNA AMP PROBE: CPT | Performed by: ADVANCED PRACTICE MIDWIFE

## 2018-06-06 NOTE — MR AVS SNAPSHOT
After Visit Summary   2018    Keysha Dixon Caro    MRN: [id number]           Patient Information     Date Of Birth          1990        Visit Information        Provider Department      2018 2:00 PM Wendy Best CNM [facility]        Today's Diagnoses     Obesity affecting pregnancy, antepartum    -  1    Supervision of other normal pregnancy, antepartum        H/O:         Malpresentation before onset of labor, single or unspecified fetus           Follow-ups after your visit        Follow-up notes from your care team     Return in about 1 week (around 2018) for Prenatal care with EILEEN.      Your next 10 appointments already scheduled     2018  2:00 PM CDT   ESTABLISHED PRENATAL with ALIX Amaral CNM   [facility] ([facility])    [address] [place] [address] [address]  [facility] [id number] [phone]            2018  2:00 PM CDT   ESTABLISHED PRENATAL with ALIX Ramsey CNM   [facility] ([facility])    [address] [place] [address]  [facility] [id number] [phone]            2018  2:00 PM CDT   ESTABLISHED PRENATAL with ALIX Ramsey CNM   [facility] ([facility])    [address] [place] [address] [phone]              Who to contact     If you have questions or need follow up information about today's clinic visit or your schedule please contact [facility] directly at [phone].  Normal or non-critical lab and imaging results will be communicated to you by MyChart, letter or phone within 4 business days after the clinic has received the results. If you do not hear from us within 7 days, please contact the clinic through MyChart or phone. If you have a critical or abnormal lab result, we will notify you by phone as soon as  possible.  Submit refill requests through [employer] or call your pharmacy and they will forward the refill request to us. Please allow 3 business days for your refill to be completed.          Additional Information About Your Visit        Triplify[employer] Information     [employer] gives you secure access to your electronic health record. If you see a primary care provider, you can also send messages to your care team and make appointments. If you have questions, please call your primary care clinic.  If you do not have a primary care provider, please call [phone] and they will assist you.        Care EveryWhere ID     This is your Care EveryWhere ID. This could be used by other organizations to access your [place] medical records  [id number]        Your Vitals Were     Last Period BMI (Body Mass Index)                09/23/2017 51.4 kg/m2           Blood Pressure from Last 3 Encounters:   06/06/18 118/78   05/23/18 118/72   05/09/18 136/72    Weight from Last 3 Encounters:   06/06/18 281 lb (127.5 kg)   05/23/18 285 lb (129.3 kg)   05/09/18 285 lb 8 oz (129.5 kg)              We Performed the Following     Group B strep PCR     OB hemoglobin        Primary Care Provider Fax #    Physician No Ref-Primary [phone]       No address on file        Equal Access to Services     CHHAYA RAINES : Hadtiara lyleo Agnes, waaxda luqadaha, qaybta kaalmada adeegyada, dar torres. So [facility] [phone].    ATENCIÓN: Si habla español, tiene a hughes disposición servicios gratuitos de asistencia lingüística. Llame al [phone].    We comply with applicable federal civil rights laws and [place] laws. We do not discriminate on the basis of race, color, national origin, age, disability, sex, sexual orientation, or gender identity.            Thank you!     Thank you for choosing [facility]  for your care. Our goal is always to provide you with excellent care. Hearing back from our  patients is one way we can continue to improve our services. Please take a few minutes to complete the written survey that you may receive in the mail after your visit with us. Thank you!             Your Updated Medication List - Protect others around you: Learn how to safely use, store and throw away your medicines at www.disposemymeds.org.          This list is accurate as of 6/6/18  3:46 PM.  Always use your most recent med list.                   Brand Name Dispense Instructions for use Diagnosis    ASPIRIN PO      Take 81 mg by mouth daily        breast pump Misc     1 each    1 each as needed        FLONASE NA           ondansetron 4 MG tablet    ZOFRAN    18 tablet    Take 1 tablet (4 mg) by mouth every 8 hours as needed for nausea    Nausea       Prenatal Vitamins 28-0.8 MG Tabs      1 Tab daily. chewables

## 2018-06-06 NOTE — PROGRESS NOTES
Feeling well.  Baby is active. Denies any leaking of fluid, vaginal bleeding, regular uterine contractions, or headaches or other concerns.  Transverse lie confirmed with BSUS. Confirmed with OB MDs that version could be done with history of .  Scheduled for next Monday.   Reviewed wt and option for extra monitoring due to BMI.  She declines.    GBS and hemoglobin today.    Reviewed to call [phone] for contractions, loss of fluid, vaginal bleeding, decreased fetal movement or any other questions or concerns.    RTC in 1 weeks.  Wendy Best, ANGELICA, APRN, CNM

## 2018-06-07 LAB
GP B STREP DNA SPEC QL NAA+PROBE: NEGATIVE
SPECIMEN SOURCE: NORMAL

## 2018-06-11 ENCOUNTER — HOSPITAL ENCOUNTER (OUTPATIENT)
Facility: CLINIC | Age: 28
Discharge: HOME | End: 2018-06-11
Attending: OBSTETRICS & GYNECOLOGY | Admitting: OBSTETRICS & GYNECOLOGY
Payer: COMMERCIAL

## 2018-06-11 VITALS
TEMPERATURE: 99.2 F | WEIGHT: 282 LBS | RESPIRATION RATE: 18 BRPM | SYSTOLIC BLOOD PRESSURE: 136 MMHG | DIASTOLIC BLOOD PRESSURE: 80 MMHG | HEIGHT: 61 IN | BODY MASS INDEX: 53.24 KG/M2

## 2018-06-11 PROBLEM — O32.2XX0 TRANSVERSE FETAL LIE: Status: ACTIVE | Noted: 2018-06-11

## 2018-06-11 PROCEDURE — G0463 HOSPITAL OUTPT CLINIC VISIT: HCPCS | Mod: 25

## 2018-06-11 PROCEDURE — 59025 FETAL NON-STRESS TEST: CPT

## 2018-06-11 RX ORDER — TERBUTALINE SULFATE 1 MG/ML
0.25 INJECTION, SOLUTION SUBCUTANEOUS ONCE
Status: DISCONTINUED | OUTPATIENT
Start: 2018-06-11 | End: 2018-06-11

## 2018-06-11 RX ORDER — LIDOCAINE 40 MG/G
CREAM TOPICAL
Status: CANCELLED | OUTPATIENT
Start: 2018-06-11

## 2018-06-11 NOTE — PROGRESS NOTES
"[facility]  OB Triage Note    CC: Transverse fetal presentation     HPI: Ms. Keysha Dixon Caro is a 27 year old  at 37w2d by LMP c/w 9w3d US, who presents for external cephalic version. She notes that she felt a big movement. Also notes that she has been having mild regular contractions since yesterday evening. Says they are not very painful, but would like to have her cervix checked. Otherwise denies leaking fluid, vaginal bleeding.  + Good fetal movement.    Obstetric Complications  - Transverse fetal lie   - Hx of  section followed by   - TOLAC consents singed 18  - Hx of pre-eclampsia     O:  Patient Vitals for the past 24 hrs:   BP Temp Temp src Resp Height Weight   18 0944 - 99.2  F (37.3  C) Oral - 1.549 m (5' 1\") 127.9 kg (282 lb)   18 0941 136/80 - - 18 - -     Gen: Well-appearing, NAD  Pulm: No respiratory distress   Abd: Soft, gravid  Ext: no LE edema b/l  Cervix:  C/L/H    FHT: , mod susana, accels, no decels  [place]: 2 ctx in 10 mins    BSUS: cephalic     A/P:  Ms. Keysha Dixon Caro is a 27 year old  at 37w2d by LMP c/w 9w3d US here for external cephalic version for transverse fetal lie. BSUS showed that baby is now cephalic. Discussed with patient that there is always the chance that baby could flip again. At that time may be able to attempt ECV. Patient understands.    - Category I FHT, reactive.   - Discussed labor warning signs and indication to return to care.  - Dispo: to home with follow up in the next week.       Patient seen and discussed with Dr. Quinonez who is in agreement with plan    Liseth Macias MD   OB/GYN Resident PGY-2  2018 10:34 AM      "

## 2018-06-11 NOTE — IP AVS SNAPSHOT
[facility]    [address]    [place] [address]    Phone:  [phone]                                       After Visit Summary   6/11/2018    Keysha Dixon Caro    MRN: [id number]           After Visit Summary Signature Page     I have received my discharge instructions, and my questions have been answered. I have discussed any challenges I see with this plan with the nurse or doctor.    ..........................................................................................................................................  Patient/Patient Representative Signature      ..........................................................................................................................................  Patient Representative Print Name and Relationship to Patient    ..................................................               ................................................  Date                                            Time    ..........................................................................................................................................  Reviewed by Signature/Title    ...................................................              ..............................................  Date                                                            Time

## 2018-06-11 NOTE — IP AVS SNAPSHOT
MRN:[id number]                      After Visit Summary   6/11/2018    Keysha Dixon Caro    MRN: [id number]           Thank you!     Thank you for choosing [facility] for your care. Our goal is always to provide you with excellent care. Hearing back from our patients is one way we can continue to improve our services. Please take a few minutes to complete the written survey that you may receive in the mail after you visit with us. Thank you!        Patient Information     Date Of Birth          1990        About your hospital stay     You were admitted on:  June 11, 2018 You last received care in the:  [facility]    You were discharged on:  June 11, 2018       Who to Call     For medical emergencies, please call 911.  For non-urgent questions about your medical care, please call your primary care provider or clinic, None          Attending Provider     Provider Specialty    Rita Quinonez MD OB/Gyn       Primary Care Provider Fax #    Physician No Ref-Primary [phone]      Your next 10 appointments already scheduled     Jun 13, 2018  2:00 PM CDT   ESTABLISHED PRENATAL with ALIX Amaral CNM   [facility] ([facility]    [address] [place] [id number] [phone]            Jun 20, 2018  2:00 PM CDT   ESTABLISHED PRENATAL with ALIX Ramsey CNM   [facility] ([facility])    [address] [place] [id number] [phone]            Jun 27, 2018  2:00 PM CDT   ESTABLISHED PRENATAL with ALIX Ramsey CNM   [facility] ([facility])    [address] [place] [id number] [phone]              Further instructions from your care team       Discharge Instruction for Undelivered Patients      You were seen for: Scheduled version  We Consulted: Dr Macias, Dr Quinonez  You had  "(Test or Medicine):Fetal monitoring and bedside ultrasound     Diet:   Drink 8 to 12 glasses of liquids (milk, juice, water) every day.  You may eat meals and snacks.     Activity:  Count fetal kicks everyday (see handout)  Call your doctor or nurse midwife if your baby is moving less than usual.     Call your provider if you notice:  Swelling in your face or increased swelling in your hands or legs.  Headaches that are not relieved by Tylenol (acetaminophen).  Changes in your vision (blurring: seeing spots or stars.)  Nausea (sick to your stomach) and vomiting (throwing up).   Weight gain of 5 pounds or more per week.  Heartburn that doesn't go away.  Signs of bladder infection: pain when you urinate (use the toilet), need to go more often and more urgently.  The bag of marsh (rupture of membranes) breaks, or you notice leaking in your underwear.  Bright red blood in your underwear.  Abdominal (lower belly) or stomach pain.  For first baby: Contractions (tightening) less than 5 minutes apart for one hour or more.  Second (plus) baby: Contractions (tightening) less than 10 minutes apart and getting stronger.  *If less than 34 weeks: Contractions (tightenings) more than 6 times in one hour.  Increase or change in vaginal discharge (note the color and amount)    Follow-up:  As scheduled in the clinic          Pending Results     No orders found from 6/9/2018 to 6/12/2018.            Admission Information     Date & Time Provider Department Dept. Phone    6/11/2018 Rita Quinonez MD [facility] [phone]      Your Vitals Were     Blood Pressure Temperature Respirations Height Weight Last Period    136/80 99.2  F (37.3  C) (Oral) 18 1.549 m (5' 1\") 127.9 kg (282 lb) 09/23/2017    BMI (Body Mass Index)                   53.28 kg/m2           [employer] Information     [employer] gives you secure access to your electronic health record. If you see a primary care provider, you can also send messages to your care team and " make appointments. If you have questions, please call your primary care clinic.  If you do not have a primary care provider, please call [phone] and they will assist you.        Care EveryWhere ID     This is your Care EveryWhere ID. This could be used by other organizations to access your [facility] medical records  [id number]        Equal Access to Services     CHHAYA RAINES : Hadii ayla ku hadasho Soomaali, waaxda luqadaha, qaybta kaalmada godfreyangelesasia, dar turneredinjason powell . So [facility] [phone].    ATENCIÓN: Si habla español, tiene a hughes disposición servicios gratuitos de asistencia lingüística. Bushra al [phone].    We comply with applicable federal civil rights laws and [place] laws. We do not discriminate on the basis of race, color, national origin, age, disability, sex, sexual orientation, or gender identity.               Review of your medicines      CONTINUE these medicines which have NOT CHANGED        Dose / Directions    breast pump Misc        Dose:  1 each   1 each as needed   Quantity:  1 each   Refills:  0       FLONASE NA        Refills:  0       ondansetron 4 MG tablet   Commonly known as:  ZOFRAN   Used for:  Nausea        Dose:  4 mg   Take 1 tablet (4 mg) by mouth every 8 hours as needed for nausea   Quantity:  18 tablet   Refills:  0       Prenatal Vitamins 28-0.8 MG Tabs        1 Tab daily. chewables   Refills:  0         STOP taking     ASPIRIN PO                    Protect others around you: Learn how to safely use, store and throw away your medicines at www.disposemymeds.org.             Medication List: This is a list of all your medications and when to take them. Check marks below indicate your daily home schedule. Keep this list as a reference.      Medications           Morning Afternoon Evening Bedtime As Needed    breast pump Misc   1 each as needed                                FLONASE NA                                ondansetron 4 MG tablet   Commonly  known as:  ZOFRAN   Take 1 tablet (4 mg) by mouth every 8 hours as needed for nausea                                Prenatal Vitamins 28-0.8 MG Tabs   1 Tab daily. chewables

## 2018-06-11 NOTE — PLAN OF CARE
Data: [facility] at 0924.   Reason for maternal/fetal assessment per patient is External Version  . Patient is a . Prenatal record reviewed.      Obstetric History       T2      L2     SAB0   TAB0   Ectopic0   Multiple0   Live Births2       # Outcome Date GA Lbr Mazin/2nd Weight Sex Delivery Anes PTL Lv   3 Current            2 Term 16 41w0d  4.026 kg (8 lb 14 oz) M    COREY   1 Term 14 37w0d  2.863 kg (6 lb 5 oz) M -SEC Gen N COREY         Medical History:   Past Medical History:   Diagnosis Date     NO ACTIVE PROBLEMS    . Gestational Age 37w2d. VSS. Cervix: closed per Dr Macias.  Fetal movement present. Patient denies vaginal discharge, pelvic pressure, UTI symptoms, GI problems, bloody show, vaginal bleeding, edema, headache, visual disturbances, epigastric or URQ pain, abdominal pain, rupture of membranes. Support persons Eb () present.  Here for scheduled version, baby had been transverse.  Now cephalic per Dr Macias on BSUS.  Action: Verbal consent for EFM. Triage assessment completed. EFM applied. Uterine assessment per toco. Fetal assessment: Presumed adequate fetal oxygenation documented (see flow record). Patient education pamphlets given on fetal movement counts and labor precautions. Patient instructed to report change in fetal movement, vaginal leaking of fluid or bleeding, abdominal pain, or any concerns related to the pregnancy to her nurse/physician.   Response: Dr. Macias and Dr Quinonez informed of patient arrival. Plan per provider is discharge to home. Patient verbalized understanding of education and verbalized agreement with plan. Discharged ambulatory at 1033.

## 2018-06-11 NOTE — DISCHARGE INSTRUCTIONS
Discharge Instruction for Undelivered Patients      You were seen for: Scheduled version  We Consulted: Dr Macias, Dr Quinonez  You had (Test or Medicine):Fetal monitoring and bedside ultrasound     Diet:   Drink 8 to 12 glasses of liquids (milk, juice, water) every day.  You may eat meals and snacks.     Activity:  Count fetal kicks everyday (see handout)  Call your doctor or nurse midwife if your baby is moving less than usual.     Call your provider if you notice:  Swelling in your face or increased swelling in your hands or legs.  Headaches that are not relieved by Tylenol (acetaminophen).  Changes in your vision (blurring: seeing spots or stars.)  Nausea (sick to your stomach) and vomiting (throwing up).   Weight gain of 5 pounds or more per week.  Heartburn that doesn't go away.  Signs of bladder infection: pain when you urinate (use the toilet), need to go more often and more urgently.  The bag of marsh (rupture of membranes) breaks, or you notice leaking in your underwear.  Bright red blood in your underwear.  Abdominal (lower belly) or stomach pain.  For first baby: Contractions (tightening) less than 5 minutes apart for one hour or more.  Second (plus) baby: Contractions (tightening) less than 10 minutes apart and getting stronger.  *If less than 34 weeks: Contractions (tightenings) more than 6 times in one hour.  Increase or change in vaginal discharge (note the color and amount)    Follow-up:  As scheduled in the clinic

## 2018-06-13 ENCOUNTER — PRENATAL OFFICE VISIT (OUTPATIENT)
Dept: MIDWIFE SERVICES | Facility: CLINIC | Age: 28
End: 2018-06-13
Payer: COMMERCIAL

## 2018-06-13 ENCOUNTER — NURSE TRIAGE (OUTPATIENT)
Dept: NURSING | Facility: CLINIC | Age: 28
End: 2018-06-13

## 2018-06-13 VITALS
TEMPERATURE: 98.4 F | SYSTOLIC BLOOD PRESSURE: 118 MMHG | OXYGEN SATURATION: 98 % | WEIGHT: 289.5 LBS | HEART RATE: 95 BPM | DIASTOLIC BLOOD PRESSURE: 86 MMHG | HEIGHT: 61 IN | BODY MASS INDEX: 54.66 KG/M2

## 2018-06-13 DIAGNOSIS — Z34.80 SUPERVISION OF OTHER NORMAL PREGNANCY, ANTEPARTUM: Primary | ICD-10-CM

## 2018-06-13 DIAGNOSIS — N89.8 VAGINAL DISCHARGE: ICD-10-CM

## 2018-06-13 LAB — RUPTURE OF FETAL MEMBRANES BY ROM PLUS: NEGATIVE

## 2018-06-13 PROCEDURE — 84112 EVAL AMNIOTIC FLUID PROTEIN: CPT | Performed by: ADVANCED PRACTICE MIDWIFE

## 2018-06-13 PROCEDURE — 99207 ZZC PRENATAL VISIT: CPT | Performed by: ADVANCED PRACTICE MIDWIFE

## 2018-06-13 NOTE — PROGRESS NOTES
37w4d  Cephalic presentation confirmed by BSUS. Reports ctxns every 10-15 minutes and some increased discharge or small gushes of clear fluid. ROM plus collected to r/o ROM. It is negative.  Feels ready for baby. No concerns or questions. Reports good fetal movement. Denies leaking of fluid, vaginal bleeding, regular uterine contractions, headache or other concerns.  RTC in 1 wk [facility]

## 2018-06-13 NOTE — MR AVS SNAPSHOT
After Visit Summary   6/13/2018    Keysha Dixon Caro    MRN: [id number]           Patient Information     Date Of Birth          1990        Visit Information        Provider Department      6/13/2018 2:00 PM Chanelle Sam APRN CNM [facility]        Today's Diagnoses     Supervision of other normal pregnancy, antepartum    -  1    Vaginal discharge           Follow-ups after your visit        Your next 10 appointments already scheduled     Jun 20, 2018  2:00 PM CDT   ESTABLISHED PRENATAL with ALIX Ramsey CNM   [facility] ([facility])    [address] [place] [address] [phone]            Jun 27, 2018  2:00 PM CDT   ESTABLISHED PRENATAL with ALIX Rmasey CNM   [facility] ([facility])    [address] [place] [address] [phone]              Who to contact     If you have questions or need follow up information about today's clinic visit or your schedule please contact [facility] directly at [phone].  Normal or non-critical lab and imaging results will be communicated to you by [employer], letter or phone within 4 business days after the clinic has received the results. If you do not hear from us within 7 days, please contact the clinic through [employer] or phone. If you have a critical or abnormal lab result, we will notify you by phone as soon as possible.  Submit refill requests through [employer] or call your pharmacy and they will forward the refill request to us. Please allow 3 business days for your refill to be completed.          Additional Information About Your Visit        MyChart Information     [employer] gives you secure access to your electronic health record. If you see a primary care provider, you can also send messages to your care team and make appointments. If you have questions, please  "call your primary care clinic.  If you do not have a primary care provider, please call [phone] and they will assist you.        Care EveryWhere ID     This is your Care EveryWhere ID. This could be used by other organizations to access your [facility] medical records  [id number]        Your Vitals Were     Pulse Temperature Height Last Period Pulse Oximetry BMI (Body Mass Index)    95 98.4  F (36.9  C) (Oral) 5' 1\" (1.549 m) 09/23/2017 98% 54.7 kg/m2       Blood Pressure from Last 3 Encounters:   06/13/18 118/86   06/11/18 136/80   06/06/18 118/78    Weight from Last 3 Encounters:   06/13/18 289 lb 8 oz (131.3 kg)   06/11/18 282 lb (127.9 kg)   06/06/18 281 lb (127.5 kg)              We Performed the Following     Rupture of Fetal Membranes by ROM Plus     Rupture of membranes by Amnisure - GH          Today's Medication Changes          These changes are accurate as of 6/13/18  3:55 PM.  If you have any questions, ask your nurse or doctor.               Stop taking these medicines if you haven't already. Please contact your care team if you have questions.     breast pump Misc   Stopped by:  Chanelle Sam APRN CNM           ondansetron 4 MG tablet   Commonly known as:  ZOFRAN   Stopped by:  Chanelle Sam APRN CNM                    Primary Care Provider Fax #    Physician No Ref-Primary [phone]       No address on file        Equal Access to Services     CHHAYA RAINES : Hadii ayla pro hadasho Soomaali, waaxda luqadaha, qaybta kaalmada rich, dar powell . So [facility] [phone].    ATENCIÓN: Si habla español, tiene a hughes disposición servicios gratuitos de asistencia lingüística. Llame al [phone].    We comply with applicable federal civil rights laws and [place] laws. We do not discriminate on the basis of race, color, national origin, age, disability, sex, sexual orientation, or gender identity.            Thank you!     Thank you for choosing " [facility]  for your care. Our goal is always to provide you with excellent care. Hearing back from our patients is one way we can continue to improve our services. Please take a few minutes to complete the written survey that you may receive in the mail after your visit with us. Thank you!             Your Updated Medication List - Protect others around you: Learn how to safely use, store and throw away your medicines at www.disposemymeds.org.          This list is accurate as of 6/13/18  3:55 PM.  Always use your most recent med list.                   Brand Name Dispense Instructions for use Diagnosis    doxylamine 25 MG Tabs tablet    UNISOM     Take 25 mg by mouth At Bedtime        FLONASE NA           Prenatal Vitamins 28-0.8 MG Tabs      1 Tab daily. chewables

## 2018-06-14 ENCOUNTER — TELEPHONE (OUTPATIENT)
Dept: MIDWIFE SERVICES | Facility: CLINIC | Age: 28
End: 2018-06-14

## 2018-06-14 NOTE — TELEPHONE ENCOUNTER
"\"i'M 37 +4  Weeks pregnant and I'm having intermittent contractions\".  Caller is reporting some dizziness with contractions and brief episodes of blurred vision with the contractions. Feels like one eye has some pressure, denies headache.    Paged on call provider for [facility] (Midwife) to speak to caller at [phone].  Midwife Kady is on call, page sent @ 7:30 pm via smart web.      Reason for Disposition    [1] Eye pain AND [2] brief (now gone) blurred vision or visual changes    Additional Information    Negative: Followed getting substance in the eye    Negative: Foreign body or object is or was lodged in the eye    Negative: Followed an eye injury    Negative: Followed sun lamp or sun exposure (UV keratitis)    Negative: Yellow or green discharge (pus) in the eye    Negative: Severe headache    Negative: Severe eye pain    Negative: Complete loss of vision in 1 or both eyes    Negative: [1] Pregnant > 20 weeks AND [2] new hand or face swelling    Negative: [1] Pregnant > 20 weeks AND [2] upper abdominal pain lasts > 1 hour    Negative: [1] Pregnant > 20 weeks AND [2] headache    Negative: [1] Pregnant > 20 weeks AND [2] sudden weight gain (i.e., more than 3 lbs or 1.4 kg in one week)    Negative: [1] Pregnant 23 or more weeks AND [2] baby is moving less today (e.g., kick count < 5 in 1 hour or < 10 in 2 hours)    Negative: Double vision    Negative: Many floaters in the eye    Negative: Flashes of light  (Exception: brief from pressing on the eyeball)    Negative: [1] Blurred vision or visual changes AND [2] present now AND [3] sudden onset or new (e.g., minutes, hours, days)    Negative: Patient sounds very sick or weak to the triager    Protocols used: PREGNANCY - VISION LOSS OR CHANGE-ADULT-      Alison Borjas RN  [facility]      "

## 2018-06-14 NOTE — TELEPHONE ENCOUNTER
37w5d  Pt calling 6/13/18 ~2030 reporting dizziness with contractions.  States contractions are 6-20 minutes and not consistant.  Feels sometimes dizzy inbetween,  Pt denies any visual changes, issues with Blood pressure, denies feeling this before, denies any allergy issues or sinus pain, slight headache once in a while over right eye .  + fetal movement contractions are not strong.  P; lengthy discussion about what is causing dizziness, states was not overly active today and has kept up with her fluids.  After long discussion states that fetus turned 48 hrs ago from transverse to vertex, pt was feeling more pelvic pressure.  Pt feels fetus back on left side, enc. To lay on that side and see if position changes help with reducing dizziness, this could be caused by baby being on vagus nerve, discussed position changes and pt was to call back if did not feel better.  Pt agrees with this plan of care. Enc. To call clinic in morning for appt if concerned as well.  Marissa Hillman CNM

## 2018-06-16 ENCOUNTER — NURSE TRIAGE (OUTPATIENT)
Dept: NURSING | Facility: CLINIC | Age: 28
End: 2018-06-16

## 2018-06-16 ENCOUNTER — HOSPITAL ENCOUNTER (OUTPATIENT)
Facility: CLINIC | Age: 28
Discharge: HOME | End: 2018-06-16
Attending: ADVANCED PRACTICE MIDWIFE | Admitting: ADVANCED PRACTICE MIDWIFE
Payer: COMMERCIAL

## 2018-06-16 VITALS
HEART RATE: 91 BPM | TEMPERATURE: 98.2 F | DIASTOLIC BLOOD PRESSURE: 84 MMHG | OXYGEN SATURATION: 98 % | SYSTOLIC BLOOD PRESSURE: 132 MMHG

## 2018-06-16 PROBLEM — O36.8190 DECREASED FETAL MOVEMENT: Status: ACTIVE | Noted: 2018-06-16

## 2018-06-16 PROCEDURE — 59025 FETAL NON-STRESS TEST: CPT | Mod: 26 | Performed by: ADVANCED PRACTICE MIDWIFE

## 2018-06-16 PROCEDURE — 99213 OFFICE O/P EST LOW 20 MIN: CPT | Mod: 25 | Performed by: ADVANCED PRACTICE MIDWIFE

## 2018-06-16 RX ORDER — ONDANSETRON 2 MG/ML
4 INJECTION INTRAMUSCULAR; INTRAVENOUS EVERY 6 HOURS PRN
Status: DISCONTINUED | OUTPATIENT
Start: 2018-06-16 | End: 2018-06-16 | Stop reason: HOSPADM

## 2018-06-16 NOTE — IP AVS SNAPSHOT
MRN:[id number]                      After Visit Summary   6/16/2018    Keysha Dixon Caro    MRN: [id number]           Thank you!     Thank you for choosing [facility] for your care. Our goal is always to provide you with excellent care. Hearing back from our patients is one way we can continue to improve our services. Please take a few minutes to complete the written survey that you may receive in the mail after you visit with us. Thank you!        Patient Information     Date Of Birth          1990        Designated Caregiver       Most Recent Value    Caregiver    Will someone help with your care after discharge? no      About your hospital stay     You were admitted on:  June 16, 2018 You last received care in the:  [facility]    You were discharged on:  June 16, 2018       Who to Call     For medical emergencies, please call 911.  For non-urgent questions about your medical care, please call your primary care provider or clinic, None          Attending Provider     Provider Specialty    Wendy Best CNM [facility]       Primary Care Provider Fax #    Physician No Ref-Primary [phone]      Your next 10 appointments already scheduled     Jun 20, 2018  2:00 PM CDT   ESTABLISHED PRENATAL with ALIX Ramsey CNM   [facility]    [address] [place] [id number] [phone]            Jun 27, 2018  2:00 PM CDT   ESTABLISHED PRENATAL with ALIX Ramsey CNM   [facility] ([facility]    [address] [place] [address] [address]  [facility] [id number] [phone]              Further instructions from your care team       Discharge Instruction for Undelivered Patients      You were seen for: decreased fetal movement  We Consulted: Estephania  You had (Test or Medicine):NST, ultrasound      Call your provider if you notice:  Swelling in your face or increased swelling in  your hands or legs.  Headaches that are not relieved by Tylenol (acetaminophen).  Changes in your vision (blurring: seeing spots or stars.)  Nausea (sick to your stomach) and vomiting (throwing up).   Weight gain of 5 pounds or more per week.  Heartburn that doesn't go away.  Signs of bladder infection: pain when you urinate (use the toilet), need to go more often and more urgently.  The bag of marsh (rupture of membranes) breaks, or you notice leaking in your underwear.  Bright red blood in your underwear.  Abdominal (lower belly) or stomach pain.  For first baby: Contractions (tightening) less than 5 minutes apart for one hour or more.  Second (plus) baby: Contractions (tightening) less than 10 minutes apart and getting stronger.  *If less than 34 weeks: Contractions (tightenings) more than 6 times in one hour.  Increase or change in vaginal discharge (note the color and amount)      Follow-up:  Monday at [facility] for a version.           Pending Results     No orders found from 6/14/2018 to 6/17/2018.            Statement of Approval     Ordered          06/16/18 2001  I have reviewed and agree with all the recommendations and orders detailed in this document.  EFFECTIVE NOW     Approved and electronically signed by:  Wendy Best CNM             Admission Information     Date & Time Provider Department Dept. Phone    6/16/2018 Wendy Best CNM [facility] [phone]      Your Vitals Were     Last Period                   09/23/2017           MyChart Information     [employer] gives you secure access to your electronic health record. If you see a primary care provider, you can also send messages to your care team and make appointments. If you have questions, please call your primary care clinic.  If you do not have a primary care provider, please call [phone] and they will assist you.        Care EveryWhere ID     This is your Care EveryWhere ID. This could be used by other  organizations to access your [facility] medical records  [id number]        Equal Access to Services     GURINDERILEANA OLU : Jd pierre Somiguel, waaxda luwilladaha, qaybta kaartda rich, dar turneredinjason torres. So [facility] [phone].    ATENCIÓN: Si habla español, tiene a hughes disposición servicios gratuitos de asistencia lingüística. Llame al [phone].    We comply with applicable federal civil rights laws and [place] laws. We do not discriminate on the basis of race, color, national origin, age, disability, sex, sexual orientation, or gender identity.               Review of your medicines      UNREVIEWED medicines. Ask your doctor about these medicines        Dose / Directions    aspirin 81 MG tablet        Dose:  81 mg   Take 81 mg by mouth daily   Refills:  0       doxylamine 25 MG Tabs tablet   Commonly known as:  UNISOM        Dose:  25 mg   Take 25 mg by mouth At Bedtime   Refills:  0       FLONASE NA        Refills:  0       Prenatal Vitamins 28-0.8 MG Tabs        1 Tab daily. chewables   Refills:  0                Protect others around you: Learn how to safely use, store and throw away your medicines at www.disposemymeds.org.             Medication List: This is a list of all your medications and when to take them. Check marks below indicate your daily home schedule. Keep this list as a reference.      Medications           Morning Afternoon Evening Bedtime As Needed    aspirin 81 MG tablet   Take 81 mg by mouth daily                                doxylamine 25 MG Tabs tablet   Commonly known as:  UNISOM   Take 25 mg by mouth At Bedtime                                FLONASE NA                                Prenatal Vitamins 28-0.8 MG Tabs   1 Tab daily. chewables

## 2018-06-16 NOTE — IP AVS SNAPSHOT
[facility]    [address]    [place] [address]    Phone:  [phone]                                       After Visit Summary   6/16/2018    Keysha Dixon Caro    MRN: [id number]           After Visit Summary Signature Page     I have received my discharge instructions, and my questions have been answered. I have discussed any challenges I see with this plan with the nurse or doctor.    ..........................................................................................................................................  Patient/Patient Representative Signature      ..........................................................................................................................................  Patient Representative Print Name and Relationship to Patient    ..................................................               ................................................  Date                                            Time    ..........................................................................................................................................  Reviewed by Signature/Title    ...................................................              ..............................................  Date                                                            Time

## 2018-06-16 NOTE — TELEPHONE ENCOUNTER
"  Reason for Disposition    Leakage of fluid from vagina     Paged on call midwife Wendy Estephania via [employer] at 5:40 PM to call pt back at [phone].    Additional Information    Negative: Passed out (i.e., lost consciousness, collapsed and was not responding)    Negative: Shock suspected (e.g., cold/pale/clammy skin, too weak to stand, low BP, rapid pulse)    Negative: Difficult to awaken or acting confused  (e.g., disoriented, slurred speech)    Negative: [1] SEVERE abdominal pain (e.g., excruciating) AND [2] constant AND [3] present > 1 hour    Negative: Severe bleeding (e.g., continuous red blood from vagina, or large blood clots)    Negative: Umbilical cord hanging out of the vagina (shiny, white, curled appearance, \"like telephone cord\")    Negative: Uncontrollable urge to push (i.e., feels like baby is coming out now)    Negative: Can see baby    Negative: Sounds like a life-threatening emergency to the triager    Negative: Vaginal bleeding    (Exception: spotting after intercourse or pelvic exam, or slight pinkish or brownish mucous discharge)    Negative: [1] Contractions AND [2] any vaginal bleeding (including: red blood, clots, spotting, or pink/brown mucous)    Negative: [1] Contractions > 10 minutes apart AND [2] persist > 24 hours AND [3] no improvement using Care Advice    Negative: Contractions < 10 minutes apart for 1 hour (i.e., 6 or more contractions an hour)    Negative: MODERATE-SEVERE abdominal pain    Answer Assessment - Initial Assessment Questions  1. ONSET: \"When did the symptoms begin?\"         today  2. CONTRACTIONS: \"Describe the contractions that you are having.\" (e.g., duration, frequency, regularity, severity)      One or two contractions today  3. MICHAEL: \"What date are you expecting to deliver?\"      6/30/18  4. PARITY: \"Have you had a baby before?\" If yes, \"How long did the labor last?\"      3rd pregnancy  5. FETAL MOVEMENT: \"Has the baby's movement decreased or changed " "significantly from normal?\"      Less movement today than usual 5 kicks in past 1.5 hrs  6. OTHER SYMPTOMS: \"Do you have any other symptoms?\" (e.g., leaking fluid from vagina, fever, hand/facial swelling)      Vomited X3 today    Protocols used: PREGNANCY - LABOR - -ADULT-AH    "

## 2018-06-17 NOTE — PROVIDER NOTIFICATION
06/16/18 1921   Provider Notification   Provider Name/Title BATSHEVA Best   Method of Notification Electronic Page   Request Evaluate in Person   Notification Reason Patient Arrived   Patient here for decreased fetal movement, n/v for 2 days and cough.

## 2018-06-17 NOTE — DISCHARGE INSTRUCTIONS
Discharge Instruction for Undelivered Patients      You were seen for: decreased fetal movement  We Consulted: Estephania  You had (Test or Medicine):NST, ultrasound      Call your provider if you notice:  Swelling in your face or increased swelling in your hands or legs.  Headaches that are not relieved by Tylenol (acetaminophen).  Changes in your vision (blurring: seeing spots or stars.)  Nausea (sick to your stomach) and vomiting (throwing up).   Weight gain of 5 pounds or more per week.  Heartburn that doesn't go away.  Signs of bladder infection: pain when you urinate (use the toilet), need to go more often and more urgently.  The bag of marsh (rupture of membranes) breaks, or you notice leaking in your underwear.  Bright red blood in your underwear.  Abdominal (lower belly) or stomach pain.  For first baby: Contractions (tightening) less than 5 minutes apart for one hour or more.  Second (plus) baby: Contractions (tightening) less than 10 minutes apart and getting stronger.  *If less than 34 weeks: Contractions (tightenings) more than 6 times in one hour.  Increase or change in vaginal discharge (note the color and amount)      Follow-up:  Monday at [facility] for a version.

## 2018-06-17 NOTE — H&P
Keysha Dixon Caro is a 27 year old female,  ,         Patient's last menstrual period was 2017.. Estimated Date of Delivery: 2018 is calculated from lmp and verified with U/S    [facility] for evaluation of decreased fetal movement    HPI Noticed less fetal movement today.  She also vomited 3 times which is common for her during pregnancy.  She is able to keep fluids down.  She and her  both have a cough also.  Their children just got over coughs.       PRENATAL COURSE  Prenatal care began at 16 wks gestation for a total of 9 prenatal visits.  Total wt gain -20 lbs   Prenatal vital signs WNL  Prenatal course was complicated by History of  with first birth and Maternal Obesity - BMI 58    HISTORIES  No Known Allergies  Past Medical History:   Diagnosis Date     NO ACTIVE PROBLEMS      Pneumonia      Uncomplicated asthma      Past Surgical History:   Procedure Laterality Date      SECTION  2014     No family history on file.  Social History   Substance Use Topics     Smoking status: Never Smoker     Smokeless tobacco: Never Used     Alcohol use Yes      Comment: [place]       LABS:     Lab Results   Component Value Date    ABO O 2018       Lab Results   Component Value Date    RH Pos 2018     Rhogam not indicated  Rubella immune   Treponema Pallidum Antibody  Negative    HIV    Non-reactive   Lab Results   Component Value Date    HGB 10.5 2018      Lab Results   Component Value Date    GBS Negative 2018       ROS  C: NEGATIVE for fever, chills, change in weight  I: NEGATIVE for worrisome rashes, moles or lesions  E/M: NEGATIVE for ear, mouth and throat problems  R: Has cough, no shortness of breath.  Hard to sleep   CV: NEGATIVE for chest pain, palpitations or peripheral edema  GI: NEGATIVE for nausea, abdominal pain, heartburn, or change in bowel habits.  Has a history of nausea and vomiting  during pregnancy.    : NEGATIVE for frequency, dysuria, or hematuria  PSYCHIATRIC:  NEGATIVE for depression, anxiety or other mental health concerns    PHYSICAL EXAM:  LMP 09/23/2017  General appearance healthy, alert, active, no distress, cooperative, smiling and over weight  Neuro:  denies headache and visual disturbances  Psych: Mentation normal and bright   Legs: 2+/2+, no clonus, no edema     Abdomen: gravid, single Breech fetus, non-tender, EFW 7 lbs. Pt is not patrick    FETAL HEART TONES: baseline 135 with moderate FHR variability and accelerations.  No decelerations present.     MEMBRANES: Membranes are intact    PELVIC EXAM: Deferred, no labor concerns   BLOODY SHOW:: no    ASSESSMENT:  IUP @ 38 wks gestation in not in labor  Fetal malposition   Maternal Obesity   NST  reactive    Cough - probably viral  Vomiting     PLAN:  Ultrasound done to confirm fetal position - breech with head in upper mid abdomen.  Version scheduled for Monday with Dr. Boone.  Baby was very active during today's visit.  NST reactive. Continue to monitor activity and do not hesitate to return for monitoring if any concerns.  Discussed staying well hydrated.   Discussed comfort measures for cough.    Wendy Best, ANGELICA, APRN, CNM

## 2018-06-18 ENCOUNTER — HOSPITAL ENCOUNTER (OUTPATIENT)
Facility: CLINIC | Age: 28
End: 2018-06-18
Attending: OBSTETRICS & GYNECOLOGY | Admitting: OBSTETRICS & GYNECOLOGY
Payer: COMMERCIAL

## 2018-06-18 ENCOUNTER — HOSPITAL ENCOUNTER (OUTPATIENT)
Facility: CLINIC | Age: 28
Discharge: HOME | End: 2018-06-18
Attending: OBSTETRICS & GYNECOLOGY | Admitting: OBSTETRICS & GYNECOLOGY
Payer: COMMERCIAL

## 2018-06-18 VITALS — SYSTOLIC BLOOD PRESSURE: 118 MMHG | DIASTOLIC BLOOD PRESSURE: 70 MMHG

## 2018-06-18 PROCEDURE — G0463 HOSPITAL OUTPT CLINIC VISIT: HCPCS | Mod: 25

## 2018-06-18 PROCEDURE — 59025 FETAL NON-STRESS TEST: CPT

## 2018-06-18 PROCEDURE — 59025 FETAL NON-STRESS TEST: CPT | Mod: 26 | Performed by: OBSTETRICS & GYNECOLOGY

## 2018-06-18 PROCEDURE — 99213 OFFICE O/P EST LOW 20 MIN: CPT | Mod: 25 | Performed by: OBSTETRICS & GYNECOLOGY

## 2018-06-18 RX ORDER — LIDOCAINE 40 MG/G
CREAM TOPICAL
Status: DISCONTINUED | OUTPATIENT
Start: 2018-06-18 | End: 2018-06-18 | Stop reason: HOSPADM

## 2018-06-18 NOTE — IP AVS SNAPSHOT
[facility]    [address]    [place] [address]    Phone:  [phone]                                       After Visit Summary   6/18/2018    Keysha Dixon Caro    MRN: [id number]           After Visit Summary Signature Page     I have received my discharge instructions, and my questions have been answered. I have discussed any challenges I see with this plan with the nurse or doctor.    ..........................................................................................................................................  Patient/Patient Representative Signature      ..........................................................................................................................................  Patient Representative Print Name and Relationship to Patient    ..................................................               ................................................  Date                                            Time    ..........................................................................................................................................  Reviewed by Signature/Title    ...................................................              ..............................................  Date                                                            Time

## 2018-06-18 NOTE — DISCHARGE INSTRUCTIONS
[facility]  Discharge Instruction for Undelivered Patients      You were seen for: Version  We Consulted: Dr. Boone  You had (Test or Medicine):fetal monitoring     Diet:   Drink 8 to 12 glasses of liquids (milk, juice, water) every day.  You may eat meals and snacks.     Activity:  Count fetal kicks everyday (see handout)  Call your doctor or nurse midwife if your baby is moving less than usual.     Call your provider if you notice:  Swelling in your face or increased swelling in your hands or legs.  Headaches that are not relieved by Tylenol (acetaminophen).  Changes in your vision (blurring: seeing spots or stars.)  Nausea (sick to your stomach) and vomiting (throwing up).   Weight gain of 5 pounds or more per week.  Heartburn that doesn't go away.  Signs of bladder infection: pain when you urinate (use the toilet), need to go more often and more urgently.  The bag of marsh (rupture of membranes) breaks, or you notice leaking in your underwear.  Bright red blood in your underwear.  Abdominal (lower belly) or stomach pain.  Second (plus) baby: Contractions (tightening) less than 10 minutes apart and getting stronger.  Increase or change in vaginal discharge (note the color and amount)  Other: none    Follow-up:  As scheduled in the clinic

## 2018-06-18 NOTE — PROGRESS NOTES
[facility]  OB Triage      Keysha Dixon Caro MRN# [id number]   Age: 27 year old YOB: 1990     CC: external cephalic version     HPI:  Ms. Keysha Dixon Caro is a 27 year old  at 38w2d by LMP c/w 9w3d US, who presents for external cephalic version. Patient desires TOLAC.  She had some contractions yesterday, but denies any regular ones today. Notes that she did feel baby move recently. Denies any vaginal bleeding, and loss of fluid.   + normal fetal movement.    Pregnancy Complications:  - History of  section followed by successful   - Breech presentation   - Desires TOLAC, consent signed 18  - Obesity: BMI 58    Prenatal Labs:   Lab Results   Component Value Date    ABO O 2018    RH Pos 2018    AS Neg 2018    HGB 10.5 (L) 2018     GBS Status:   Lab Results   Component Value Date    GBS Negative 2018     Ultrasounds  1. Level II US 18 normal fetal anatomy   2. Growth US 18: anterior placenta, EVW 43%ILE    OB History  Obstetric History       T2      L2     SAB0   TAB0   Ectopic0   Multiple0   Live Births2       # Outcome Date GA Lbr Mazin/2nd Weight Sex Delivery Anes PTL Lv   3 Current            2 Term 16 41w0d  4.026 kg (8 lb 14 oz) M    COREY   1 Term 14 37w0d  2.863 kg (6 lb 5 oz) M -SEC Gen N COREY          PMHx:   Past Medical History:   Diagnosis Date     NO ACTIVE PROBLEMS      Pneumonia      Uncomplicated asthma      PSHx:   Past Surgical History:   Procedure Laterality Date      SECTION  2014     Meds:   No prescriptions prior to admission.     Allergies:  No Known Allergies   FmHx: No family history on file.  SocHx: She denies any tobacco, alcohol, or other drug use during this pregnancy.    ROS:   Complete 10-point ROS negative except as noted in HPI.She denies headache, blurry vision, chest pain, shortness of breath, RUQ pain, nausea, vomiting,  dysuria, hematuria or extremity edema.    PE:  Vit: No data found.     Gen: Well-appearing, NAD, comfortable   Abd: Soft, gravid, non-tender  Ext: trace LE edema b/l  Cx: deferred    Pres:  cephalic by BSUS  Memb: intact              FHT: Baseline 130, mod variability, accelerations, no decelerations   [place]: 0-1 contractions in 10 minutes      Assessment/Plan:   Ms. Keysha Dixon Caro is a 27 year old , at 38w2d by LMP c/w 9w3d US, who presents for external cephalic version. Patient desires TOLAC. On BSUS baby is cephalic. Discussed with patient that baby could flip again. At that time may be able to attempt ECV. Patient understands.     - Category I FHT, reactive.   - Discussed labor warning signs and indication to return to care.  - Follow up in clinic this week  - Dispo: to home with follow up in the next week.       The patient was discussed with Dr. Boone who is in agreement with the treatment plan.    Liseth Macias MD   OB/GYN Resident PGY-2  2018 10:47 AM    Physician Attestation   I, Cristina Boone, personally examined and evaluated this patient.  I discussed the patient with the medical student and/or resident and care team, and agree with the assessment and plan of care as documented in the note of 2018  [date].      I personally reviewed vital signs, medications, labs, imaging and exam. And fetal tracing.    Key findings: 27 year old  at 38w2d here for ECV but procedure not needed because fetus is cephalic on admission confirmed by bedside US. Pregnancy complicated by obesity, h/o CS, desire for . Category 1 EFM reactive NST. Discharge to home. Recommend frequent surveillance of fetal presentation as she seems to have unstable lie but is currently vertex. RTC for routine prenatal care, has appt this week.  Cristina Boone MD  Date of Service (when I saw the patient): 18

## 2018-06-18 NOTE — PROGRESS NOTES
Pt. Here for a version, found to be vertex.  FHR adjusted as baby is very active.  D/c to home undelivered.  Pt. Denies questions or concerns and verbalizes understanding of when to RTC or hospital.

## 2018-06-18 NOTE — IP AVS SNAPSHOT
MRN:[id number]                      After Visit Summary   6/18/2018    Keysha Dixon Caro    MRN: [id number]           Thank you!     Thank you for choosing [facility] for your care. Our goal is always to provide you with excellent care. Hearing back from our patients is one way we can continue to improve our services. Please take a few minutes to complete the written survey that you may receive in the mail after you visit with us. Thank you!        Patient Information     Date Of Birth          1990        About your hospital stay     You were admitted on:  June 18, 2018 You last received care in the:  [facility]    You were discharged on:  June 18, 2018       Who to Call     For medical emergencies, please call 911.  For non-urgent questions about your medical care, please call your primary care provider or clinic, None          Attending Provider     Provider Specialty    Cristina Boone MD OB/Gyn       Primary Care Provider Fax #    Physician No Ref-Primary [phone]      Your next 10 appointments already scheduled     Jun 20, 2018  2:00 PM CDT   ESTABLISHED PRENATAL with ALIX Ramsey CNM   [facility] ([facility])    [address] [place] [id number] [phone]            Jun 27, 2018  2:00 PM CDT   ESTABLISHED PRENATAL with ALIX Ramsey CNM   [facility] ([facility])    [address] [place] [id number] [phone]              Further instructions from your care team       ip ob  Discharge Instruction for Undelivered Patients      You were seen for: Version  We Consulted: Dr. Boone  You had (Test or Medicine):fetal monitoring     Diet:   Drink 8 to 12 glasses of liquids (milk, juice, water) every day.  You may eat meals and snacks.     Activity:  Count fetal kicks everyday (see handout)  Call your doctor or nurse midwife if your baby  is moving less than usual.     Call your provider if you notice:  Swelling in your face or increased swelling in your hands or legs.  Headaches that are not relieved by Tylenol (acetaminophen).  Changes in your vision (blurring: seeing spots or stars.)  Nausea (sick to your stomach) and vomiting (throwing up).   Weight gain of 5 pounds or more per week.  Heartburn that doesn't go away.  Signs of bladder infection: pain when you urinate (use the toilet), need to go more often and more urgently.  The bag of marsh (rupture of membranes) breaks, or you notice leaking in your underwear.  Bright red blood in your underwear.  Abdominal (lower belly) or stomach pain.  Second (plus) baby: Contractions (tightening) less than 10 minutes apart and getting stronger.  Increase or change in vaginal discharge (note the color and amount)  Other: none    Follow-up:  As scheduled in the clinic          Pending Results     No orders found from 6/16/2018 to 6/19/2018.            Admission Information     Date & Time Provider Department Dept. Phone    6/18/2018 Cristina Boone MD [facility] [phone]      Your Vitals Were     Blood Pressure Last Period                118/70 09/23/2017          [employer] Information     [employer] gives you secure access to your electronic health record. If you see a primary care provider, you can also send messages to your care team and make appointments. If you have questions, please call your primary care clinic.  If you do not have a primary care provider, please call [phone] and they will assist you.        Care EveryWhere ID     This is your Care EveryWhere ID. This could be used by other organizations to access your [facility] medical records  [id number]        Equal Access to Services     [facility]: Jd Alarcon, deon cuevas, dar wise. So [facility] [phone].    ATENCIÓN: Si angelito arroyo, nery dobson hughes  disposición servicios gratuitos de asistencia lingüística. Bushra beauchamp [phone].    We comply with applicable federal civil rights laws and [place] laws. We do not discriminate on the basis of race, color, national origin, age, disability, sex, sexual orientation, or gender identity.               Review of your medicines      UNREVIEWED medicines. Ask your doctor about these medicines        Dose / Directions    ASPIRIN ADULT LOW STRENGTH PO   Indication:  pregnancy   This may have changed:  Another medication with the same name was removed. Continue taking this medication, and follow the directions you see here.   Ask about: Which instructions should I use?        Dose:  81 mg   Take 81 mg by mouth daily   Refills:  0         CONTINUE these medicines which have NOT CHANGED        Dose / Directions    doxylamine 25 MG Tabs tablet   Commonly known as:  UNISOM        Dose:  25 mg   Take 25 mg by mouth At Bedtime   Refills:  0       FLONASE NA        Refills:  0       Prenatal Vitamins 28-0.8 MG Tabs        1 Tab daily. chewables   Refills:  0                Protect others around you: Learn how to safely use, store and throw away your medicines at www.disposemymeds.org.             Medication List: This is a list of all your medications and when to take them. Check marks below indicate your daily home schedule. Keep this list as a reference.      Medications           Morning Afternoon Evening Bedtime As Needed    doxylamine 25 MG Tabs tablet   Commonly known as:  UNISOM   Take 25 mg by mouth At Bedtime                                FLONASE NA                                Prenatal Vitamins 28-0.8 MG Tabs   1 Tab daily. chewables                                  ASK your doctor about these medications           Morning Afternoon Evening Bedtime As Needed    ASPIRIN ADULT LOW STRENGTH PO   Take 81 mg by mouth daily   Ask about: Which instructions should I use?

## 2018-06-20 ENCOUNTER — PRENATAL OFFICE VISIT (OUTPATIENT)
Dept: MIDWIFE SERVICES | Facility: CLINIC | Age: 28
End: 2018-06-20
Payer: COMMERCIAL

## 2018-06-20 VITALS — BODY MASS INDEX: 54.32 KG/M2 | SYSTOLIC BLOOD PRESSURE: 126 MMHG | DIASTOLIC BLOOD PRESSURE: 72 MMHG | WEIGHT: 287.5 LBS

## 2018-06-20 DIAGNOSIS — O34.219 PREVIOUS CESAREAN DELIVERY, ANTEPARTUM CONDITION OR COMPLICATION: Primary | ICD-10-CM

## 2018-06-20 DIAGNOSIS — O32.0XX0 UNSTABLE FETAL LIE, SINGLE OR UNSPECIFIED FETUS: ICD-10-CM

## 2018-06-20 DIAGNOSIS — Z34.83 ENCOUNTER FOR SUPERVISION OF OTHER NORMAL PREGNANCY IN THIRD TRIMESTER: Primary | ICD-10-CM

## 2018-06-20 PROCEDURE — 99207 ZZC PRENATAL VISIT: CPT | Performed by: ADVANCED PRACTICE MIDWIFE

## 2018-06-20 PROCEDURE — 59426 ANTEPARTUM CARE ONLY: CPT | Performed by: ADVANCED PRACTICE MIDWIFE

## 2018-06-20 NOTE — MR AVS SNAPSHOT
After Visit Summary   6/20/2018    Keysha Dixon Caro    MRN: [id number]           Patient Information     Date Of Birth          1990        Visit Information        Provider Department      6/20/2018 2:00 PM Leanne Martínez APRN CNM [facility]        Today's Diagnoses     Encounter for supervision of other normal pregnancy in third trimester    -  1       Follow-ups after your visit        Your next 10 appointments already scheduled     Jun 22, 2018  3:30 PM CDT   ESTABLISHED PRENATAL with Ela Horowitz MD   [facility]    [address] [place] [id number] [phone]            Jun 27, 2018  2:00 PM CDT   ESTABLISHED PRENATAL with ALIX Ramsey CNM   [facility]    [address] [place] [id number] [phone]              Who to contact     If you have questions or need follow up information about today's clinic visit or your schedule please contact [facility] directly at [phone].  Normal or non-critical lab and imaging results will be communicated to you by Gogobeanshart, letter or phone within 4 business days after the clinic has received the results. If you do not hear from us within 7 days, please contact the clinic through Gogobeanshart or phone. If you have a critical or abnormal lab result, we will notify you by phone as soon as possible.  Submit refill requests through [employer] or call your pharmacy and they will forward the refill request to us. Please allow 3 business days for your refill to be completed.          Additional Information About Your Visit        MyChart Information     [employer] gives you secure access to your electronic health record. If you see a primary care provider, you can also send messages to your care team and make appointments. If you have questions, please call your  primary care clinic.  If you do not have a primary care provider, please call [phone] and they will assist you.        Care EveryWhere ID     This is your Care EveryWhere ID. This could be used by other organizations to access your [facility] medical records  [id number]        Your Vitals Were     Last Period BMI (Body Mass Index)                09/23/2017 54.32 kg/m2           Blood Pressure from Last 3 Encounters:   06/20/18 126/72   06/18/18 118/70   06/16/18 132/84    Weight from Last 3 Encounters:   06/20/18 287 lb 8 oz (130.4 kg)   06/13/18 289 lb 8 oz (131.3 kg)   06/11/18 282 lb (127.9 kg)              Today, you had the following     No orders found for display       Primary Care Provider Fax #    Physician No Ref-Primary [phone]       No address on file        Equal Access to Services     ILEANA RAINES : Hadii ayla Alarcon, waalexx cuevas, qacolleenta kaalmada rich, dar powell . So [facility] [phone].    ATENCIÓN: Si habla español, tiene a hughes disposición servicios gratuitos de asistencia lingüística. Llame al [phone].    We comply with applicable federal civil rights laws and [place] laws. We do not discriminate on the basis of race, color, national origin, age, disability, sex, sexual orientation, or gender identity.            Thank you!     Thank you for choosing [facility]  for your care. Our goal is always to provide you with excellent care. Hearing back from our patients is one way we can continue to improve our services. Please take a few minutes to complete the written survey that you may receive in the mail after your visit with us. Thank you!             Your Updated Medication List - Protect others around you: Learn how to safely use, store and throw away your medicines at www.disposemymeds.org.          This list is accurate as of 6/20/18  3:37 PM.  Always use your most recent med list.                   Brand Name  Dispense Instructions for use Diagnosis    ASPIRIN ADULT LOW STRENGTH PO      Take 81 mg by mouth daily        doxylamine 25 MG Tabs tablet    UNISOM     Take 25 mg by mouth At Bedtime        FLONASE NA           Prenatal Vitamins 28-0.8 MG Tabs      1 Tab daily. chewables

## 2018-06-20 NOTE — PROGRESS NOTES
38w4d  Patient feeling well. Positive fetal movement. Denies water leaking, vaginal bleeding, decreased fetal movement, contraction pain, or headaches.   Doing well. Felt the baby flip breech again last night. Feels like she is very unstable and we discussed getting a  scheduled versus version. She feels it is best to get a  scheduled. Will get it scheduled Monday and will have appointment Friday. No other questions or concerns today.   Danger signs reviewed, pre-eclampsia signs and symptoms discussed.   Knows when to call triage and has phone numbers.    Follow up Friday with the physicians to get labs and discuss .   Leanne Martínez CNM

## 2018-06-20 NOTE — PLAN OF CARE
Problem: Patient Care Overview  Goal: Plan of Care/Patient Progress Review  Pt has been diagnosed with kidney stones.  Pt hydrated w/ IV fluids, pain being treated with IV medications. Plan to move her to antepartum room 420. She was transferred with her  accompanying her. Report given to Nory Fitzgerald RN.       normal... Well appearing, well nourished, awake, alert, oriented to person, place, time/situation and in no apparent distress.

## 2018-06-21 DIAGNOSIS — Z01.818 PRE-OP EXAM: ICD-10-CM

## 2018-06-21 DIAGNOSIS — O34.219 PREVIOUS CESAREAN DELIVERY, ANTEPARTUM CONDITION OR COMPLICATION: Primary | ICD-10-CM

## 2018-06-21 RX ORDER — CITRIC ACID/SODIUM CITRATE 334-500MG
30 SOLUTION, ORAL ORAL
Status: CANCELLED | OUTPATIENT
Start: 2018-06-26

## 2018-06-21 RX ORDER — CEFAZOLIN SODIUM 1 G/50ML
3 SOLUTION INTRAVENOUS
Status: CANCELLED | OUTPATIENT
Start: 2018-06-26

## 2018-06-21 RX ORDER — CEFAZOLIN SODIUM 1 G/3ML
1 INJECTION, POWDER, FOR SOLUTION INTRAMUSCULAR; INTRAVENOUS SEE ADMIN INSTRUCTIONS
Status: CANCELLED | OUTPATIENT
Start: 2018-06-26

## 2018-06-21 RX ORDER — SODIUM CHLORIDE, SODIUM LACTATE, POTASSIUM CHLORIDE, CALCIUM CHLORIDE 600; 310; 30; 20 MG/100ML; MG/100ML; MG/100ML; MG/100ML
INJECTION, SOLUTION INTRAVENOUS CONTINUOUS
Status: CANCELLED | OUTPATIENT
Start: 2018-06-26

## 2018-06-22 ENCOUNTER — SURGERY (OUTPATIENT)
Age: 28
End: 2018-06-22

## 2018-06-22 ENCOUNTER — HOSPITAL ENCOUNTER (INPATIENT)
Facility: CLINIC | Age: 28
LOS: 3 days | Discharge: HOME HEALTH | End: 2018-06-25
Attending: OBSTETRICS & GYNECOLOGY | Admitting: OBSTETRICS & GYNECOLOGY
Payer: COMMERCIAL

## 2018-06-22 ENCOUNTER — ANESTHESIA EVENT (OUTPATIENT)
Dept: OBGYN | Facility: CLINIC | Age: 28
End: 2018-06-22
Payer: COMMERCIAL

## 2018-06-22 ENCOUNTER — ANESTHESIA (OUTPATIENT)
Dept: OBGYN | Facility: CLINIC | Age: 28
End: 2018-06-22
Payer: COMMERCIAL

## 2018-06-22 ENCOUNTER — NURSE TRIAGE (OUTPATIENT)
Dept: NURSING | Facility: CLINIC | Age: 28
End: 2018-06-22

## 2018-06-22 DIAGNOSIS — Z98.891 S/P CESAREAN SECTION: Primary | ICD-10-CM

## 2018-06-22 LAB
ABO + RH BLD: NORMAL
ABO + RH BLD: NORMAL
BLD GP AB SCN SERPL QL: NORMAL
BLOOD BANK CMNT PATIENT-IMP: NORMAL
ERYTHROCYTE [DISTWIDTH] IN BLOOD BY AUTOMATED COUNT: 15.8 % (ref 10–15)
HCT VFR BLD AUTO: 33.7 % (ref 35–47)
HGB BLD-MCNC: 10.3 G/DL (ref 11.7–15.7)
MCH RBC QN AUTO: 25.4 PG (ref 26.5–33)
MCHC RBC AUTO-ENTMCNC: 30.6 G/DL (ref 31.5–36.5)
MCV RBC AUTO: 83 FL (ref 78–100)
PLATELET # BLD AUTO: 380 10E9/L (ref 150–450)
RBC # BLD AUTO: 4.06 10E12/L (ref 3.8–5.2)
SPECIMEN EXP DATE BLD: NORMAL
WBC # BLD AUTO: 12.4 10E9/L (ref 4–11)

## 2018-06-22 PROCEDURE — 86900 BLOOD TYPING SEROLOGIC ABO: CPT | Performed by: STUDENT IN AN ORGANIZED HEALTH CARE EDUCATION/TRAINING PROGRAM

## 2018-06-22 PROCEDURE — 25000128 H RX IP 250 OP 636: Performed by: STUDENT IN AN ORGANIZED HEALTH CARE EDUCATION/TRAINING PROGRAM

## 2018-06-22 PROCEDURE — 85027 COMPLETE CBC AUTOMATED: CPT | Performed by: STUDENT IN AN ORGANIZED HEALTH CARE EDUCATION/TRAINING PROGRAM

## 2018-06-22 PROCEDURE — 25000128 H RX IP 250 OP 636

## 2018-06-22 PROCEDURE — C9290 INJ, BUPIVACAINE LIPOSOME: HCPCS

## 2018-06-22 PROCEDURE — 25000128 H RX IP 250 OP 636: Performed by: INTERNAL MEDICINE

## 2018-06-22 PROCEDURE — 25000125 ZZHC RX 250: Performed by: STUDENT IN AN ORGANIZED HEALTH CARE EDUCATION/TRAINING PROGRAM

## 2018-06-22 PROCEDURE — 40000170 ZZH STATISTIC PRE-PROCEDURE ASSESSMENT II: Performed by: OBSTETRICS & GYNECOLOGY

## 2018-06-22 PROCEDURE — 12000030 ZZH R&B OB INTERMEDIATE UMMC

## 2018-06-22 PROCEDURE — 96360 HYDRATION IV INFUSION INIT: CPT

## 2018-06-22 PROCEDURE — 0DNW0ZZ RELEASE PERITONEUM, OPEN APPROACH: ICD-10-PCS | Performed by: OBSTETRICS & GYNECOLOGY

## 2018-06-22 PROCEDURE — 36000059 ZZH SURGERY LEVEL 3 EA 15 ADDTL MIN UMMC: Performed by: OBSTETRICS & GYNECOLOGY

## 2018-06-22 PROCEDURE — 25000132 ZZH RX MED GY IP 250 OP 250 PS 637: Performed by: STUDENT IN AN ORGANIZED HEALTH CARE EDUCATION/TRAINING PROGRAM

## 2018-06-22 PROCEDURE — C1765 ADHESION BARRIER: HCPCS | Performed by: OBSTETRICS & GYNECOLOGY

## 2018-06-22 PROCEDURE — 71000014 ZZH RECOVERY PHASE 1 LEVEL 2 FIRST HR: Performed by: OBSTETRICS & GYNECOLOGY

## 2018-06-22 PROCEDURE — 59515 CESAREAN DELIVERY: CPT | Mod: 22 | Performed by: OBSTETRICS & GYNECOLOGY

## 2018-06-22 PROCEDURE — 0DNU0ZZ RELEASE OMENTUM, OPEN APPROACH: ICD-10-PCS | Performed by: OBSTETRICS & GYNECOLOGY

## 2018-06-22 PROCEDURE — 37000009 ZZH ANESTHESIA TECHNICAL FEE, EACH ADDTL 15 MIN: Performed by: OBSTETRICS & GYNECOLOGY

## 2018-06-22 PROCEDURE — 86850 RBC ANTIBODY SCREEN: CPT | Performed by: STUDENT IN AN ORGANIZED HEALTH CARE EDUCATION/TRAINING PROGRAM

## 2018-06-22 PROCEDURE — 36000057 ZZH SURGERY LEVEL 3 1ST 30 MIN - UMMC: Performed by: OBSTETRICS & GYNECOLOGY

## 2018-06-22 PROCEDURE — 27110028 ZZH OR GENERAL SUPPLY NON-STERILE: Performed by: OBSTETRICS & GYNECOLOGY

## 2018-06-22 PROCEDURE — 27210794 ZZH OR GENERAL SUPPLY STERILE: Performed by: OBSTETRICS & GYNECOLOGY

## 2018-06-22 PROCEDURE — 37000008 ZZH ANESTHESIA TECHNICAL FEE, 1ST 30 MIN: Performed by: OBSTETRICS & GYNECOLOGY

## 2018-06-22 PROCEDURE — 86901 BLOOD TYPING SEROLOGIC RH(D): CPT | Performed by: STUDENT IN AN ORGANIZED HEALTH CARE EDUCATION/TRAINING PROGRAM

## 2018-06-22 PROCEDURE — 36415 COLL VENOUS BLD VENIPUNCTURE: CPT | Performed by: STUDENT IN AN ORGANIZED HEALTH CARE EDUCATION/TRAINING PROGRAM

## 2018-06-22 PROCEDURE — 40000275 ZZH STATISTIC RCP TIME EA 10 MIN

## 2018-06-22 PROCEDURE — 40000977 ZZH STATISTIC ATTENDANCE AT DELIVERY

## 2018-06-22 RX ORDER — DEXTROSE, SODIUM CHLORIDE, SODIUM LACTATE, POTASSIUM CHLORIDE, AND CALCIUM CHLORIDE 5; .6; .31; .03; .02 G/100ML; G/100ML; G/100ML; G/100ML; G/100ML
INJECTION, SOLUTION INTRAVENOUS CONTINUOUS
Status: DISCONTINUED | OUTPATIENT
Start: 2018-06-22 | End: 2018-06-25 | Stop reason: HOSPADM

## 2018-06-22 RX ORDER — MORPHINE SULFATE 1 MG/ML
INJECTION, SOLUTION EPIDURAL; INTRATHECAL; INTRAVENOUS PRN
Status: DISCONTINUED | OUTPATIENT
Start: 2018-06-22 | End: 2018-06-22

## 2018-06-22 RX ORDER — OXYTOCIN/0.9 % SODIUM CHLORIDE 30/500 ML
PLASTIC BAG, INJECTION (ML) INTRAVENOUS
Status: DISCONTINUED
Start: 2018-06-22 | End: 2018-06-22 | Stop reason: HOSPADM

## 2018-06-22 RX ORDER — OXYCODONE HYDROCHLORIDE 5 MG/1
5-10 TABLET ORAL
Status: DISCONTINUED | OUTPATIENT
Start: 2018-06-22 | End: 2018-06-25 | Stop reason: HOSPADM

## 2018-06-22 RX ORDER — ACETAMINOPHEN 325 MG/1
650 TABLET ORAL EVERY 4 HOURS PRN
Status: DISCONTINUED | OUTPATIENT
Start: 2018-06-25 | End: 2018-06-25 | Stop reason: HOSPADM

## 2018-06-22 RX ORDER — LIDOCAINE 40 MG/G
CREAM TOPICAL
Status: DISCONTINUED | OUTPATIENT
Start: 2018-06-22 | End: 2018-06-25 | Stop reason: HOSPADM

## 2018-06-22 RX ORDER — IBUPROFEN 600 MG/1
600 TABLET, FILM COATED ORAL EVERY 6 HOURS PRN
Status: DISCONTINUED | OUTPATIENT
Start: 2018-06-22 | End: 2018-06-25 | Stop reason: HOSPADM

## 2018-06-22 RX ORDER — ONDANSETRON 2 MG/ML
INJECTION INTRAMUSCULAR; INTRAVENOUS PRN
Status: DISCONTINUED | OUTPATIENT
Start: 2018-06-22 | End: 2018-06-22

## 2018-06-22 RX ORDER — OXYTOCIN 10 [USP'U]/ML
10 INJECTION, SOLUTION INTRAMUSCULAR; INTRAVENOUS
Status: DISCONTINUED | OUTPATIENT
Start: 2018-06-22 | End: 2018-06-25 | Stop reason: HOSPADM

## 2018-06-22 RX ORDER — NALOXONE HYDROCHLORIDE 0.4 MG/ML
.1-.4 INJECTION, SOLUTION INTRAMUSCULAR; INTRAVENOUS; SUBCUTANEOUS
Status: CANCELLED | OUTPATIENT
Start: 2018-06-22 | End: 2018-06-23

## 2018-06-22 RX ORDER — BISACODYL 10 MG
10 SUPPOSITORY, RECTAL RECTAL DAILY PRN
Status: DISCONTINUED | OUTPATIENT
Start: 2018-06-24 | End: 2018-06-25 | Stop reason: HOSPADM

## 2018-06-22 RX ORDER — FENTANYL CITRATE 50 UG/ML
25-50 INJECTION, SOLUTION INTRAMUSCULAR; INTRAVENOUS
Status: DISCONTINUED | OUTPATIENT
Start: 2018-06-22 | End: 2018-06-22 | Stop reason: HOSPADM

## 2018-06-22 RX ORDER — NALOXONE HYDROCHLORIDE 0.4 MG/ML
.1-.4 INJECTION, SOLUTION INTRAMUSCULAR; INTRAVENOUS; SUBCUTANEOUS
Status: DISCONTINUED | OUTPATIENT
Start: 2018-06-22 | End: 2018-06-25 | Stop reason: HOSPADM

## 2018-06-22 RX ORDER — CEFAZOLIN SODIUM 1 G/50ML
3 SOLUTION INTRAVENOUS
Status: CANCELLED | OUTPATIENT
Start: 2018-06-22

## 2018-06-22 RX ORDER — CEFAZOLIN SODIUM 1 G/3ML
INJECTION, POWDER, FOR SOLUTION INTRAMUSCULAR; INTRAVENOUS PRN
Status: DISCONTINUED | OUTPATIENT
Start: 2018-06-22 | End: 2018-06-22

## 2018-06-22 RX ORDER — AMOXICILLIN 250 MG
1 CAPSULE ORAL 2 TIMES DAILY PRN
Status: DISCONTINUED | OUTPATIENT
Start: 2018-06-22 | End: 2018-06-25 | Stop reason: HOSPADM

## 2018-06-22 RX ORDER — SODIUM CHLORIDE, SODIUM LACTATE, POTASSIUM CHLORIDE, CALCIUM CHLORIDE 600; 310; 30; 20 MG/100ML; MG/100ML; MG/100ML; MG/100ML
INJECTION, SOLUTION INTRAVENOUS
Status: COMPLETED
Start: 2018-06-22 | End: 2018-06-22

## 2018-06-22 RX ORDER — SIMETHICONE 80 MG
80 TABLET,CHEWABLE ORAL 4 TIMES DAILY PRN
Status: DISCONTINUED | OUTPATIENT
Start: 2018-06-22 | End: 2018-06-25 | Stop reason: HOSPADM

## 2018-06-22 RX ORDER — DEXAMETHASONE SODIUM PHOSPHATE 4 MG/ML
INJECTION, SOLUTION INTRA-ARTICULAR; INTRALESIONAL; INTRAMUSCULAR; INTRAVENOUS; SOFT TISSUE PRN
Status: DISCONTINUED | OUTPATIENT
Start: 2018-06-22 | End: 2018-06-22

## 2018-06-22 RX ORDER — IBUPROFEN 800 MG/1
800 TABLET, FILM COATED ORAL EVERY 6 HOURS PRN
Status: DISCONTINUED | OUTPATIENT
Start: 2018-06-22 | End: 2018-06-25 | Stop reason: HOSPADM

## 2018-06-22 RX ORDER — ACETAMINOPHEN 325 MG/1
975 TABLET ORAL ONCE
Status: CANCELLED | OUTPATIENT
Start: 2018-06-22 | End: 2018-06-22

## 2018-06-22 RX ORDER — ONDANSETRON 2 MG/ML
4 INJECTION INTRAMUSCULAR; INTRAVENOUS EVERY 6 HOURS PRN
Status: DISCONTINUED | OUTPATIENT
Start: 2018-06-22 | End: 2018-06-25 | Stop reason: HOSPADM

## 2018-06-22 RX ORDER — MISOPROSTOL 200 UG/1
400 TABLET ORAL
Status: DISCONTINUED | OUTPATIENT
Start: 2018-06-22 | End: 2018-06-25 | Stop reason: HOSPADM

## 2018-06-22 RX ORDER — CITRIC ACID/SODIUM CITRATE 334-500MG
30 SOLUTION, ORAL ORAL
Status: CANCELLED | OUTPATIENT
Start: 2018-06-22

## 2018-06-22 RX ORDER — NALOXONE HYDROCHLORIDE 0.4 MG/ML
.1-.4 INJECTION, SOLUTION INTRAMUSCULAR; INTRAVENOUS; SUBCUTANEOUS
Status: CANCELLED | OUTPATIENT
Start: 2018-06-22

## 2018-06-22 RX ORDER — FENTANYL CITRATE 50 UG/ML
INJECTION, SOLUTION INTRAMUSCULAR; INTRAVENOUS PRN
Status: DISCONTINUED | OUTPATIENT
Start: 2018-06-22 | End: 2018-06-22

## 2018-06-22 RX ORDER — SODIUM CHLORIDE, SODIUM LACTATE, POTASSIUM CHLORIDE, CALCIUM CHLORIDE 600; 310; 30; 20 MG/100ML; MG/100ML; MG/100ML; MG/100ML
INJECTION, SOLUTION INTRAVENOUS CONTINUOUS
Status: CANCELLED | OUTPATIENT
Start: 2018-06-22

## 2018-06-22 RX ORDER — AMOXICILLIN 250 MG
2 CAPSULE ORAL 2 TIMES DAILY PRN
Status: DISCONTINUED | OUTPATIENT
Start: 2018-06-22 | End: 2018-06-25 | Stop reason: HOSPADM

## 2018-06-22 RX ORDER — MORPHINE SULFATE 1 MG/ML
INJECTION, SOLUTION EPIDURAL; INTRATHECAL; INTRAVENOUS
Status: DISCONTINUED
Start: 2018-06-22 | End: 2018-06-22 | Stop reason: HOSPADM

## 2018-06-22 RX ORDER — CEFAZOLIN SODIUM 1 G/3ML
1 INJECTION, POWDER, FOR SOLUTION INTRAMUSCULAR; INTRAVENOUS SEE ADMIN INSTRUCTIONS
Status: DISCONTINUED | OUTPATIENT
Start: 2018-06-22 | End: 2018-06-22 | Stop reason: HOSPADM

## 2018-06-22 RX ORDER — NALOXONE HYDROCHLORIDE 0.4 MG/ML
.1-.4 INJECTION, SOLUTION INTRAMUSCULAR; INTRAVENOUS; SUBCUTANEOUS
Status: DISCONTINUED | OUTPATIENT
Start: 2018-06-22 | End: 2018-06-22

## 2018-06-22 RX ORDER — DIPHENHYDRAMINE HYDROCHLORIDE 50 MG/ML
25 INJECTION INTRAMUSCULAR; INTRAVENOUS EVERY 6 HOURS PRN
Status: DISCONTINUED | OUTPATIENT
Start: 2018-06-22 | End: 2018-06-25 | Stop reason: HOSPADM

## 2018-06-22 RX ORDER — SODIUM CHLORIDE, SODIUM LACTATE, POTASSIUM CHLORIDE, CALCIUM CHLORIDE 600; 310; 30; 20 MG/100ML; MG/100ML; MG/100ML; MG/100ML
INJECTION, SOLUTION INTRAVENOUS CONTINUOUS
Status: DISCONTINUED | OUTPATIENT
Start: 2018-06-22 | End: 2018-06-22 | Stop reason: HOSPADM

## 2018-06-22 RX ORDER — DIPHENHYDRAMINE HCL 25 MG
25 CAPSULE ORAL EVERY 6 HOURS PRN
Status: DISCONTINUED | OUTPATIENT
Start: 2018-06-22 | End: 2018-06-25 | Stop reason: HOSPADM

## 2018-06-22 RX ORDER — CEFAZOLIN SODIUM 1 G/50ML
3 SOLUTION INTRAVENOUS
Status: DISCONTINUED | OUTPATIENT
Start: 2018-06-22 | End: 2018-06-22 | Stop reason: HOSPADM

## 2018-06-22 RX ORDER — ONDANSETRON 2 MG/ML
4 INJECTION INTRAMUSCULAR; INTRAVENOUS EVERY 6 HOURS PRN
Status: DISCONTINUED | OUTPATIENT
Start: 2018-06-22 | End: 2018-06-22

## 2018-06-22 RX ORDER — HYDROMORPHONE HYDROCHLORIDE 1 MG/ML
.3-.5 INJECTION, SOLUTION INTRAMUSCULAR; INTRAVENOUS; SUBCUTANEOUS EVERY 5 MIN PRN
Status: DISCONTINUED | OUTPATIENT
Start: 2018-06-22 | End: 2018-06-22 | Stop reason: HOSPADM

## 2018-06-22 RX ORDER — HYDROCORTISONE 2.5 %
CREAM (GRAM) TOPICAL 3 TIMES DAILY PRN
Status: DISCONTINUED | OUTPATIENT
Start: 2018-06-22 | End: 2018-06-25 | Stop reason: HOSPADM

## 2018-06-22 RX ORDER — HYDROMORPHONE HYDROCHLORIDE 1 MG/ML
.3-.5 INJECTION, SOLUTION INTRAMUSCULAR; INTRAVENOUS; SUBCUTANEOUS EVERY 30 MIN PRN
Status: DISCONTINUED | OUTPATIENT
Start: 2018-06-22 | End: 2018-06-25 | Stop reason: HOSPADM

## 2018-06-22 RX ORDER — CEFAZOLIN SODIUM 1 G/3ML
1 INJECTION, POWDER, FOR SOLUTION INTRAMUSCULAR; INTRAVENOUS SEE ADMIN INSTRUCTIONS
Status: CANCELLED | OUTPATIENT
Start: 2018-06-22

## 2018-06-22 RX ORDER — FLUMAZENIL 0.1 MG/ML
0.2 INJECTION, SOLUTION INTRAVENOUS
Status: CANCELLED | OUTPATIENT
Start: 2018-06-22

## 2018-06-22 RX ORDER — ONDANSETRON 2 MG/ML
4 INJECTION INTRAMUSCULAR; INTRAVENOUS ONCE
Status: COMPLETED | OUTPATIENT
Start: 2018-06-22 | End: 2018-06-22

## 2018-06-22 RX ORDER — IBUPROFEN 400 MG/1
400 TABLET, FILM COATED ORAL EVERY 6 HOURS PRN
Status: DISCONTINUED | OUTPATIENT
Start: 2018-06-22 | End: 2018-06-25 | Stop reason: HOSPADM

## 2018-06-22 RX ORDER — PROPOFOL 10 MG/ML
INJECTION, EMULSION INTRAVENOUS PRN
Status: DISCONTINUED | OUTPATIENT
Start: 2018-06-22 | End: 2018-06-22

## 2018-06-22 RX ORDER — ONDANSETRON 4 MG/1
4 TABLET, ORALLY DISINTEGRATING ORAL EVERY 30 MIN PRN
Status: DISCONTINUED | OUTPATIENT
Start: 2018-06-22 | End: 2018-06-22 | Stop reason: HOSPADM

## 2018-06-22 RX ORDER — OXYTOCIN/0.9 % SODIUM CHLORIDE 30/500 ML
340 PLASTIC BAG, INJECTION (ML) INTRAVENOUS CONTINUOUS PRN
Status: DISCONTINUED | OUTPATIENT
Start: 2018-06-22 | End: 2018-06-25 | Stop reason: HOSPADM

## 2018-06-22 RX ORDER — ONDANSETRON 2 MG/ML
4 INJECTION INTRAMUSCULAR; INTRAVENOUS EVERY 30 MIN PRN
Status: DISCONTINUED | OUTPATIENT
Start: 2018-06-22 | End: 2018-06-22 | Stop reason: HOSPADM

## 2018-06-22 RX ORDER — CITRIC ACID/SODIUM CITRATE 334-500MG
30 SOLUTION, ORAL ORAL
Status: COMPLETED | OUTPATIENT
Start: 2018-06-22 | End: 2018-06-22

## 2018-06-22 RX ORDER — LANOLIN 100 %
OINTMENT (GRAM) TOPICAL
Status: DISCONTINUED | OUTPATIENT
Start: 2018-06-22 | End: 2018-06-25 | Stop reason: HOSPADM

## 2018-06-22 RX ORDER — ACETAMINOPHEN 325 MG/1
975 TABLET ORAL EVERY 8 HOURS
Status: DISCONTINUED | OUTPATIENT
Start: 2018-06-22 | End: 2018-06-25 | Stop reason: HOSPADM

## 2018-06-22 RX ORDER — BUPIVACAINE HYDROCHLORIDE 7.5 MG/ML
INJECTION, SOLUTION INTRASPINAL PRN
Status: DISCONTINUED | OUTPATIENT
Start: 2018-06-22 | End: 2018-06-22

## 2018-06-22 RX ORDER — BUPIVACAINE HYDROCHLORIDE 7.5 MG/ML
INJECTION, SOLUTION INTRASPINAL
Status: DISCONTINUED
Start: 2018-06-22 | End: 2018-06-22 | Stop reason: HOSPADM

## 2018-06-22 RX ORDER — SODIUM CHLORIDE, SODIUM LACTATE, POTASSIUM CHLORIDE, CALCIUM CHLORIDE 600; 310; 30; 20 MG/100ML; MG/100ML; MG/100ML; MG/100ML
INJECTION, SOLUTION INTRAVENOUS CONTINUOUS PRN
Status: DISCONTINUED | OUTPATIENT
Start: 2018-06-22 | End: 2018-06-22

## 2018-06-22 RX ORDER — OXYTOCIN/0.9 % SODIUM CHLORIDE 30/500 ML
100 PLASTIC BAG, INJECTION (ML) INTRAVENOUS CONTINUOUS
Status: DISCONTINUED | OUTPATIENT
Start: 2018-06-22 | End: 2018-06-25 | Stop reason: HOSPADM

## 2018-06-22 RX ORDER — DIPHENHYDRAMINE HYDROCHLORIDE 50 MG/ML
25 INJECTION INTRAMUSCULAR; INTRAVENOUS ONCE
Status: COMPLETED | OUTPATIENT
Start: 2018-06-22 | End: 2018-06-22

## 2018-06-22 RX ADMIN — ONDANSETRON 4 MG: 2 INJECTION INTRAMUSCULAR; INTRAVENOUS at 21:07

## 2018-06-22 RX ADMIN — SODIUM CITRATE AND CITRIC ACID MONOHYDRATE 30 ML: 500; 334 SOLUTION ORAL at 12:20

## 2018-06-22 RX ADMIN — OXYTOCIN-SODIUM CHLORIDE 0.9% IV SOLN 30 UNIT/500ML 100 ML/HR: 30-0.9/5 SOLUTION at 17:37

## 2018-06-22 RX ADMIN — PROPOFOL 10 MG: 10 INJECTION, EMULSION INTRAVENOUS at 14:26

## 2018-06-22 RX ADMIN — SODIUM CHLORIDE, POTASSIUM CHLORIDE, SODIUM LACTATE AND CALCIUM CHLORIDE 1000 ML: 600; 310; 30; 20 INJECTION, SOLUTION INTRAVENOUS at 08:16

## 2018-06-22 RX ADMIN — ONDANSETRON 4 MG: 2 INJECTION INTRAMUSCULAR; INTRAVENOUS at 14:04

## 2018-06-22 RX ADMIN — DEXAMETHASONE SODIUM PHOSPHATE 4 MG: 4 INJECTION, SOLUTION INTRA-ARTICULAR; INTRALESIONAL; INTRAMUSCULAR; INTRAVENOUS; SOFT TISSUE at 14:38

## 2018-06-22 RX ADMIN — FENTANYL CITRATE 25 MCG: 50 INJECTION, SOLUTION INTRAMUSCULAR; INTRAVENOUS at 14:44

## 2018-06-22 RX ADMIN — SODIUM CHLORIDE, POTASSIUM CHLORIDE, SODIUM LACTATE AND CALCIUM CHLORIDE: 600; 310; 30; 20 INJECTION, SOLUTION INTRAVENOUS at 12:35

## 2018-06-22 RX ADMIN — PHENYLEPHRINE HYDROCHLORIDE 0.5 MCG/KG/MIN: 10 INJECTION, SOLUTION INTRAMUSCULAR; INTRAVENOUS; SUBCUTANEOUS at 13:00

## 2018-06-22 RX ADMIN — Medication 0.4 MG: at 16:52

## 2018-06-22 RX ADMIN — ACETAMINOPHEN 975 MG: 325 TABLET, FILM COATED ORAL at 19:01

## 2018-06-22 RX ADMIN — SODIUM CHLORIDE, POTASSIUM CHLORIDE, SODIUM LACTATE AND CALCIUM CHLORIDE 1000 ML: 600; 310; 30; 20 INJECTION, SOLUTION INTRAVENOUS at 18:50

## 2018-06-22 RX ADMIN — PROPOFOL 10 MG: 10 INJECTION, EMULSION INTRAVENOUS at 14:36

## 2018-06-22 RX ADMIN — PROPOFOL 10 MG: 10 INJECTION, EMULSION INTRAVENOUS at 14:32

## 2018-06-22 RX ADMIN — FENTANYL CITRATE 25 MCG: 50 INJECTION, SOLUTION INTRAMUSCULAR; INTRAVENOUS at 14:45

## 2018-06-22 RX ADMIN — DIPHENHYDRAMINE HYDROCHLORIDE 25 MG: 50 INJECTION, SOLUTION INTRAMUSCULAR; INTRAVENOUS at 21:07

## 2018-06-22 RX ADMIN — SODIUM CHLORIDE, POTASSIUM CHLORIDE, SODIUM LACTATE AND CALCIUM CHLORIDE: 600; 310; 30; 20 INJECTION, SOLUTION INTRAVENOUS at 13:40

## 2018-06-22 RX ADMIN — DIPHENHYDRAMINE HYDROCHLORIDE 25 MG: 25 CAPSULE ORAL at 19:01

## 2018-06-22 RX ADMIN — CEFAZOLIN 2 G: 1 INJECTION, POWDER, FOR SOLUTION INTRAMUSCULAR; INTRAVENOUS at 13:11

## 2018-06-22 RX ADMIN — FENTANYL CITRATE 25 MCG: 50 INJECTION INTRAMUSCULAR; INTRAVENOUS at 16:13

## 2018-06-22 RX ADMIN — MORPHINE SULFATE 0.15 MG: 1 INJECTION, SOLUTION EPIDURAL; INTRATHECAL; INTRAVENOUS at 12:58

## 2018-06-22 RX ADMIN — BUPIVACAINE HYDROCHLORIDE IN DEXTROSE 1.6 ML: 7.5 INJECTION, SOLUTION SUBARACHNOID at 12:58

## 2018-06-22 RX ADMIN — ONDANSETRON 4 MG: 2 INJECTION INTRAMUSCULAR; INTRAVENOUS at 14:21

## 2018-06-22 RX ADMIN — ONDANSETRON 4 MG: 2 INJECTION INTRAMUSCULAR; INTRAVENOUS at 17:08

## 2018-06-22 RX ADMIN — SODIUM CHLORIDE, POTASSIUM CHLORIDE, SODIUM LACTATE AND CALCIUM CHLORIDE 1000 ML: 600; 310; 30; 20 INJECTION, SOLUTION INTRAVENOUS at 06:00

## 2018-06-22 RX ADMIN — SODIUM CHLORIDE, SODIUM LACTATE, POTASSIUM CHLORIDE, CALCIUM CHLORIDE AND DEXTROSE MONOHYDRATE: 5; 600; 310; 30; 20 INJECTION, SOLUTION INTRAVENOUS at 21:52

## 2018-06-22 NOTE — ANESTHESIA POSTPROCEDURE EVALUATION
Patient: Keysha Dixon Caro    Procedure(s):  Repeat  Section - Wound Class: II-Clean Contaminated    Diagnosis:History Of Previous   Diagnosis Additional Information: No value filed.    Anesthesia Type:  Spinal    Note:  Anesthesia Post Evaluation    Patient location during evaluation: OB PACU  Patient participation: Able to fully participate in evaluation  Level of consciousness: awake and alert  Pain management: adequate  Airway patency: patent  Cardiovascular status: acceptable  Respiratory status: acceptable  Hydration status: acceptable  PONV: none             Last vitals:  Vitals:    18 1525 18 1530 18 1540   BP: 127/79 127/79 119/82   Resp: (!) 31 (!) 41 26   Temp:      SpO2: 93% 93% 91%         Electronically Signed By: Cristina Nolen MD  2018  3:51 PM

## 2018-06-22 NOTE — H&P
L&D History and Physical   2018  Keysha Dixon Caro  [id number]      HPI: Keysha Dixon Caro is a 27 year old  at 38w6d by LMP c/w 9w3d US who presents with contractions. They have been increasing in intensity this morning. Says contractions are now every 3-5 minutes. Patient denies any loss of fluid, does note that she lost her mucus plug. Patient denies any vaginal bleeding. She denies headache, vision changes, chest pain, shortness of breath, fever, chills, nausea, vomiting or other systemic complaints. She denies vaginal bleeding or loss of fluid and is feeling normal fetal movement.      Her pregnancy has been complicated by:  - History of  section followed by successful   - Unstable fetal lie, currently breech  - Obesity: BMI 54    ROS: No headaches, vision changes, nausea, vomiting, fevers, chills, chest pain, SOB, abdominal pain, constipation, diarrhea, dysuria, changes in vaginal discharge or edema in extremities noted.     OBHX:   Obstetric History       T2      L2     SAB0   TAB0   Ectopic0   Multiple0   Live Births2       # Outcome Date GA Lbr Mazin/2nd Weight Sex Delivery Anes PTL Lv   3 Current            2 Term 16 41w0d  4.026 kg (8 lb 14 oz) M    COREY   1 Term 14 37w0d  2.863 kg (6 lb 5 oz) M -SEC Gen N COREY        Past Medical History:   Diagnosis Date     Cardiac abnormality      Chronic kidney disease      Hypertension      NO ACTIVE PROBLEMS      Pneumonia      Uncomplicated asthma     no MDI     Past Surgical History:   Procedure Laterality Date      SECTION  2014     Medications:     No current facility-administered medications on file prior to encounter.   Current Outpatient Prescriptions on File Prior to Encounter:  ASPIRIN ADULT LOW STRENGTH PO Take 81 mg by mouth daily   doxylamine (UNISOM) 25 MG TABS tablet Take 25 mg by mouth At Bedtime   Fluticasone Propionate (FLONASE NA)    Prenatal Vit-Fe Fumarate-FA (PRENATAL  VITAMINS) 28-0.8 MG TABS 1 Tab daily. chewables     No Known Allergies    No family history on file.    SocialHX:   Social History   Substance Use Topics     Smoking status: Never Smoker     Smokeless tobacco: Never Used     Alcohol use No      Comment: Ocass     ROS: 10-point ROS negative except as indicated in HPI.    Physical Exam:  Vitals:    06/22/18 0535   BP: 131/76   Resp: 18   Temp: 98  F (36.7  C)   TempSrc: Oral     General: alert, oriented female, resting in bed in NAD  CV: regular rate and rhythm,   Lungs: clear bilaterally, no crackles or wheezes.   Abdomen: soft, gravid, non-tender, EFW 7.5 lb.  Extremities: bilateral lower extremities non-tender with trace edema    SVE: 1/L/H, very anterior, medium consistency at 0500, 1/L/H (0630), 1/L/H (0830)  Presentation: transverse lie, head maternal right by BSUS  Membranes: intact    FHT: baseline 135, mod variability,accelerations, no decelerations, cat I reactive NST  [place]: contractions every 3 minutes    Prenatal ultrasounds:  1. Level II US 1/29/18 normal fetal anatomy   2. Growth US 5/30/18: anterior placenta, EVW 43%ILE    Prenatal Labs:   Lab Results   Component Value Date    ABO O 04/12/2018    RH Pos 04/12/2018    AS Neg 04/12/2018    HGB 10.5 (L) 06/06/2018     GBS Status:   Lab Results   Component Value Date    GBS Negative 06/06/2018     Labs:   Results for orders placed or performed during the hospital encounter of 06/22/18 (from the past 24 hour(s))   CBC with platelets   Result Value Ref Range    WBC 12.4 (H) 4.0 - 11.0 10e9/L    RBC Count 4.06 3.8 - 5.2 10e12/L    Hemoglobin 10.3 (L) 11.7 - 15.7 g/dL    Hematocrit 33.7 (L) 35.0 - 47.0 %    MCV 83 78 - 100 fl    MCH 25.4 (L) 26.5 - 33.0 pg    MCHC 30.6 (L) 31.5 - 36.5 g/dL    RDW 15.8 (H) 10.0 - 15.0 %    Platelet Count 380 150 - 450 10e9/L   ABO/Rh type and screen   Result Value Ref Range    ABO O     RH(D) Pos     Antibody Screen Neg     Test Valid Only At          [facility]  [facility]    Specimen Expires 2018      Assessment and Plan  Keysha Dixon Caro is 27 year old  at 38w6d by LMP c/w 9w3d US who presents with contractions. Patient is patrick regularly every 3 minutes and contractions are becoming more painful. Cervix is unchanged. Baby is breech by BSUS. Patient last ate at 04:30, since patient is stable plan to hold surgery 6 hours from eating. Plan to admit for repeat  section. The risks, benefits, and alternatives of  section were discussed, including the risks of bleeding, infection, injury to surrounding organs, fetal injury, and remote risks of hysterectomy. She consented to a blood transfusion in the event of a life threatening amount of bleeding. She had time to ask questions and agreed to proceed. Surgical consent was signed.    Pain:  Spinal per anesthesia.   ID:  Ancef for jennifer-op antibiotics.  FWB: Category I FHT.  Continue EFM.   PNC: Rh , Rubella immune, GBS negative, GCT normal, Placenta anterior   Fen/GI: NPO, IVFs.  PPH ppx: CBC, T&S.     # Pain Assessment:  Current Pain Score 2018   Patient currently in pain? yes   Pain score (0-10) -   Pain location Uterine   Pain descriptors Discomfort   - Keysha is experiencing pain due to contractions. Pain management was discussed and the plan was created in a collaborative fashion.  Keysha's response to the current recommendations: engaged  - Discussed options including fentanyl. Patient okay at this time.     Patient seen and care plan discussed under supervision of Dr. Horowitz.    Liseth Macias MD   OB/GYN Resident PGY-2  2018 9:07 AM    Physician Attestation   I, Ela Horowitz, saw and evaluated Keysha Dixon Caro separately from the resident,  Dr Macias.  I have reviewed and discussed with her the patient's physical exam findings and fetal status.     My key history or physical exam findings: patient is in early labor, given persistent, painful  ctx's. Fetal lie is unstable and variable.  It is not cephalic.  There has been no cervical change but with h/o previous  section and unstable fetal lie, a  section is indicated at this time.  Fetal status is reassuring with cat I tracing and reactive NST.     Key management decisions made by me: NPO and prepare for  section at 6-8 hrs NPO.    discussed plan with patient and she is in agreement.  Informed consent was obtained.     Ela Horowitz  Date of Service (when I saw the patient): 18

## 2018-06-22 NOTE — PROVIDER NOTIFICATION
06/22/18 1605   Provider Notification   Provider Name/Title Dr. Macias   Method of Notification Electronic Page   Request Evaluate-Remote   Notification Reason Other     FYI notification re: sepsis protocol trigger based on vitals signs. Pulse low 100s, RR 20-24, temp 98.1

## 2018-06-22 NOTE — PROGRESS NOTES
1505 Pt. To PACU via cart A&O x3.  Baby to breast a few minutes after arrival. Dsg dry and intact ,firm u/2 scant bleeding.

## 2018-06-22 NOTE — OP NOTE
[facility]  Full Operative Progress Note     Surgery Date:  2018    Surgeon:       Ela Horowitz      Assistants:     MD Liseth Pascual MD, PGY-2     Pre-op Diagnosis:                     -  at 38w6d  - History of  section followed by successful   - Unstable fetal lie, currently breech  - Obesity: BMI 54  - Early labor     Post-op Diagnosis:                   - Same as above now  s/p procedure below  - Extensive pelvic and abdominal adhesions       Procedure:                    - Repeat low-transverse  section with double layer uterine closure via pfannenstiel incision  - Extensive lysis of adhesions      Anesthesia: spinal      EBL: 892 cc     IVF: 1000 cc crystalloid     UOP:  250 cc clear urine at the end of the case     Drains: Wesley Catheter      Specimens: cord blood, cord segment     Complications: None apparent    Indications:   Keysha Dixon Caro is a 27 year old  at 38w6d admitted for labor with breech presentation. Decision made to proceed with repeat  section.  The risks, benefits, and alternatives of  section were discussed with the patient, and she agreed to proceed.     Findings:   1. Moderate subcutaneous scarring. Rectus diastasis, with rectus, peritoneum, and omentum all scarred as one to the anterior abdominal wall. Vesicouterine peritoneum adherent to 2/3 of the anterior surface of uterus, well above area of previous uterine scar. Thin lower uterine segment above level of previous uterine scar. Normal appearing fallopian tubes and ovaries.   2. Clear amniotic fluid  3. Liveborn female infant in breech presentation at 13:52 on 18. Apgars 9 at 1 minute & 9 at 5 minutes. Weight 3345 grams.      Procedure Details:   The patient was brought to the OR, where adequate spinal anesthesia was administered.  She was placed in the dorsal supine position with a slight leftward tilt.  She was prepped and draped in the usual sterile fashion. A surgical time out was performed. A pfannenstiel skin incision was made with the scalpel, and carried down to the underlying fascia with sharp and blunt dissection. The fascia was incised in the midline, and the incision was extended laterally with the Young scissors. The superior aspect of the fascia was grasped with the Kocher clamps. There were dense adhesions with rectus, peritoneum and omentum all scarred as one to the anterior abdominal wall. These fascial adhesions were meticulously dissected off using sharp dissection and electrocautery. An omental band was clamped, cut and tied off using 0-Vicryl. The rectus muscles were  in the midline, and the peritoneum was entered bluntly, and the opening was extended with digital pressure. Attention was then turned to the inferior aspect of the fascia, which was similarly dissected off of the underlying rectus muscles. The vesicouterine peritoneum was adherent to 2/3 of the anterior surface of the uterus. A bladder flap was done in order to remove bladder from site of hysterotomy. Prior to delivery of baby, 31 minutes spent with lysis of adhesions. Due to the extensive adhesions and unstable fetal lie, a second staff- Dr. Agrawal scrubbed into case. A transverse hysterotomy was made with the scalpel in the lower uterine segment, and the incision was extended with digital pressure and with bandage scissors. The infant was noted to be in the breech position, and was delivered atraumatically using breech maneuvers. No nuchal cord was noted. The cord was doubly clamped and cut, and the infant was handed off to the awaiting nursery staff. A segment of cord was cut. The placenta was delivered with gentle traction on the umbilical cord and uterine massage. The uterus was exteriorized and cleared of all clots and debris. Uterine tone was noted to be firm with 20 units of pitocin given through the running IV and  uterine massage.  The hysterotomy was closed with a running locked suture of 0 Monocryl.  The hysterotomy was then imbricated using an 0 Monocryl suture. The hysterotomy was noted to be hemostatic. The posterior cul-de-sac was irrigated and cleared of all clots and debris. The uterus was returned to the abdomen. The pericolic gutters were irrigated and cleared of all clots and debris. The hysterotomy was reexamined and noted to be hemostatic. Due to extensive lysis of adhesions, portion of omentum was oozing, this area was over sown with running 3-0 Vicryl. The fascia and rectus muscles were examined and areas of oozing were controlled with electrocautery. The fascia was closed with a running 0 Vicryl suture. The subcutaneous tissue was irrigated and areas of oozing were controlled with electrocautery. The subcutaneous tissue was greater than 2 cm in thickness, and was therefore closed with running 3-0 Plain. The skin was closed with staples and covered with a sterile dressing.    All sponge, needle, and instrument counts were correct. The patient tolerated the procedure well, and was transferred to recovery in stable condition. Dr. Horowitz was present and scrubbed for the entirety of the procedure.     Liseth Macias MD   OB/GYN Resident PGY-2  6/22/2018 3:23 PM        Physician Attestation   I was present for the entire procedure.  I have reviewed the note above and edited it as appropriate.   Key findings: dense pelvic adhesions required extensive adhesiolysis prior to delivery of the baby.  Care was taken to avoid bladder injury since the bladder peritoneum was adherent high up onto the uterus.   Baby was delivered without complication and closure was also uncomplicated. Due to the difficulties with adhesions, it was necessary for Dr Agrawal to scrub in for additional staff assistance.     Ela Horowitz  Date of Service (when I saw the patient): 6/22/18

## 2018-06-22 NOTE — IP AVS SNAPSHOT
[facility]    [address] [address]    Phone:  [phone]                                       After Visit Summary   6/22/2018    Keysha Dixon Caro    MRN: [id number]           After Visit Summary Signature Page     I have received my discharge instructions, and my questions have been answered. I have discussed any challenges I see with this plan with the nurse or doctor.    ..........................................................................................................................................  Patient/Patient Representative Signature      ..........................................................................................................................................  Patient Representative Print Name and Relationship to Patient    ..................................................               ................................................  Date                                            Time    ..........................................................................................................................................  Reviewed by Signature/Title    ...................................................              ..............................................  Date                                                            Time

## 2018-06-22 NOTE — PROGRESS NOTES
Note from 0630:    S: reports contractions continue, moderately painful.  She confirms she ate eggs at 0430 today.    O: /76  Temp 98  F (36.7  C) (Oral)  Resp 18  LMP 09/23/2017    FHT: 130, +accels, -decels, mod susana  Gosnell: not picking up ctx well  Sve: 1/0/-4, unchanged    A/P; IUP at 38w6d, breech presentation and h/o prior ltcs.     FWBR, cat 1 tracing   She is not currently in active labor, but continues to have ctx.  Will continue to monitor and if cervical change begins, move to OR.  If ctx stop she can go home.  If no change but continued, oncoming ob team will determine best plan, but attempt to hold of surgery for at least 6 hours from eating.    ILDA BRAVO MD

## 2018-06-22 NOTE — PLAN OF CARE
Pt PACU recovery WNL. VSS except for pulse low 100s. UO 25cc. Notified resident regarding both. Pain controlled with fentanyl and dilaudid, zofran given for brief nausea/vomiting. Pt feels pain on left side of incision and feels mostly with coughing. Bleeding WNL.     Data: Keysha Dixon Caro transferred to 7142 via cart at 1720. Baby transferred via parent's arms.  Action: Receiving unit notified of transfer: Yes. Patient and family notified of room change. Report given to Terra at bedside. Belongings sent to receiving unit. Accompanied by Registered Nurse. Oriented patient to surroundings. Call light within reach. ID bands double-checked with receiving RN.  Response: Patient tolerated transfer and is stable.

## 2018-06-22 NOTE — DISCHARGE SUMMARY
DELIVERY DISCHARGE SUMMARY    Admit date: 2018  Discharge date: 2018     Admit Dx:   -  at 38w6d  - Spontaneous labor   - History of  section followed by successful   - Unstable fetal lie, currently breech  - Obesity: BMI 54    Discharge Dx:  - Same as above, s/p low transverse  section    Procedures:  - repeat low transverse  section with double layer closure via Pfannenstiel incision  - Lysis of adhesions   - Spinal analgesia  - TAP block     Admit HPI:  Keysha Dixon Caro is a 27 year old  at 38w6d by LMP c/w 9w3d US who presents with contractions. They have been increasing in intensity this morning. Says contractions are now every 3-5 minutes. Patient denies any loss of fluid, does note that she lost her mucus plug. Patient denies any vaginal bleeding. She denies headache, vision changes, chest pain, shortness of breath, fever, chills, nausea, vomiting or other systemic complaints. She denies vaginal bleeding or loss of fluid and is feeling normal fetal movement.      Please see her admit H&P for full details of her PMH, PSH, Meds, Allergies and exam on admit.    Hospital course:  After discussion of risk and and benefits and signing informed consent the patient was taken to the operating room for  section. QBL from the delivery was 892. Please see her  Section Operative Note for full details regarding her delivery    Findings:   1. Moderate subcutaneous scarring. Rectus diastasis, with rectus, peritoneum, and omentum all scarred as one to the anterior abdominal wall. Vesicouterine peritoneum adherent to 2/3 of the anterior surface of uterus, well above area of previous uterine scar. Thin lower uterine segment above level of previous uterine scar. Normal appearing fallopian tubes and ovaries.   2. Clear amniotic fluid  3. Liveborn female infant in breech presentation at 13:52 on 18. Apgars 9 at 1 minute & 9 at 5 minutes. Weight 3345  grams.    Her postoperative course was complicated by low UOP on POD#0, which resolved with 1L IV fluid bolus. On POD#3, she was meeting all of her postpartum goals and deemed stable for discharge. She was voiding without difficulty, tolerating a regular diet without nausea and vomiting, her pain was well controlled on oral pain medicines and her lochia was appropriate. Her hemoglobin prior to delivery was 10.3 and after delivery was 9. Her Rh status was positive and Rhogam was not indicated.  Staples were removed prior to discharge    Discharge Medications:     Review of your medicines      START taking       Dose / Directions    acetaminophen 325 MG tablet   Commonly known as:  TYLENOL   Used for:  S/P  section        Dose:  650 mg   Take 2 tablets (650 mg) by mouth every 4 hours as needed for other (multimodal surgical pain management along with NSAIDS and opioid medication as indicated based on pain control and physical function.)   Quantity:  OTC   Refills:  0       ferrous sulfate 325 (65 Fe) MG tablet   Commonly known as:  IRON   Used for:  Postpartum anemia        Dose:  325 mg   Take 1 tablet (325 mg) by mouth daily (with breakfast)   Quantity:  30 tablet   Refills:  2       ibuprofen 600 MG tablet   Commonly known as:  ADVIL/MOTRIN   Used for:  S/P  section        Dose:  600 mg   Take 1 tablet (600 mg) by mouth every 6 hours as needed for other (cramping)   Quantity:  OTC   Refills:  0       oxyCODONE IR 5 MG tablet   Commonly known as:  ROXICODONE   Used for:  S/P  section        Dose:  5 mg   Take 1 tablet (5 mg) by mouth every 3 hours as needed for breakthrough pain (pain control or improvement in physical function. Hold dose for analgesic side effects.)   Quantity:  24 tablet   Refills:  0       senna-docusate 8.6-50 MG per tablet   Commonly known as:  SENOKOT-S;PERICOLACE   Used for:  S/P  section        Dose:  1 tablet   Take 1 tablet by mouth 2 times daily   Quantity:   40 tablet   Refills:  0         CONTINUE these medicines which have NOT CHANGED       Dose / Directions    ASPIRIN ADULT LOW STRENGTH PO   Indication:  pregnancy        Dose:  81 mg   Take 81 mg by mouth daily   Refills:  0       doxylamine 25 MG Tabs tablet   Commonly known as:  UNISOM        Dose:  25 mg   Take 25 mg by mouth At Bedtime   Refills:  0       FLONASE NA        Refills:  0       Prenatal Vitamins 28-0.8 MG Tabs        1 Tab daily. chewables   Refills:  0            Where to get your medicines      Some of these will need a paper prescription and others can be bought over the counter. Ask your nurse if you have questions.     Bring a paper prescription for each of these medications      ferrous sulfate 325 (65 Fe) MG tablet     oxyCODONE IR 5 MG tablet     senna-docusate 8.6-50 MG per tablet       You don't need a prescription for these medications      acetaminophen 325 MG tablet     ibuprofen 600 MG tablet               Discharge/Disposition:  Keysha Dixon Caro was discharged to home in stable condition with the following instructions/medications:  1) Call for temperature > 100.4, bright red vaginal bleeding >1 pad an hour x 2 hours, foul smelling vaginal discharge, pain not controlled by usual oral pain meds, persistent nausea and vomiting not controlled on medications, drainage or redness from incision site  2) She desired IUD for contraception.  3) For feeding she decided to breast feed.  4) She was instructed to follow-up with her primary OB in 8 weeks for a routine postpartum visit and IUD placement .  5) Discharge activity:  No heavy lifting >15 lbs or strenuous activity for 6 weeks, pelvic rest for 6 weeks, no driving or operating machinery while on narcotics.    # Discharge Pain Plan:   - During her hospitalization, Keysha experienced pain due to c/s.  The pain plan for discharge was discussed with Keysha and the plan was created in a collaborative fashion.    - Opioids prescribed on discharge:  oxycodone  - Duration of opioids after discharge: Per [facility] opioid prescribing guidelines, a 3 day prescription of opioids was provided.  - Bowel regimen: senna, docusate  - Pharmacologic adjuvants:  NSAIDs and Acetaminophen    Raya Padgett MD  Obstetrics and Gyncology, PGY-2  June 25, 2018 , 8:16 AM            Physician Attestation   I, Alyssa Wayne, saw and evaluated this patient prior to discharge.  I discussed the patient with the resident and/or medical student and agree with plan of care as documented in the note.      I personally reviewed vital signs, medications, labs and exam. Incision CDI.     I personally spent 20 minutes on discharge activities.    Discussed mild range blood pressures. Not in range indicated for treatment. Discussed precautions. Will have nurse visit for BP check and appointment for wound check and BP check in one week.    Staples removed and steristrips placed by resident.      Alyssa Wayne MD  Date of Service (when I saw the patient): 06/25/18

## 2018-06-22 NOTE — ANESTHESIA PREPROCEDURE EVALUATION
Anesthesia Evaluation     . Pt has had prior anesthetic.     No history of anesthetic complications          ROS/MED HX    ENT/Pulmonary:     (+)asthma , . .    Neurologic:  - neg neurologic ROS     Cardiovascular:     (+) hypertension----. : . . . :. .       METS/Exercise Tolerance:     Hematologic:     (+) Anemia, -      Musculoskeletal:  - neg musculoskeletal ROS       GI/Hepatic:     (+) GERD       Renal/Genitourinary:  - ROS Renal section negative       Endo:     (+) Obesity (BMI 55), .      Psychiatric:  - neg psychiatric ROS       Infectious Disease:  - neg infectious disease ROS       Malignancy:      - no malignancy   Other: Comment:  at 38w6d, breech                    Physical Exam  Normal systems: cardiovascular, pulmonary and dental    Airway   Mallampati: III  Neck ROM: full    Dental     Cardiovascular       Pulmonary                     Anesthesia Plan      History & Physical Review  History and physical reviewed and following examination; no interval change.    ASA Status:  3 .        Plan for Spinal   PONV prophylaxis:  Ondansetron (or other 5HT-3)  Additional equipment: Videolaryngoscope      Postoperative Care  Postoperative pain management:  Neuraxial analgesia and Peripheral nerve block (Single Shot).      Consents

## 2018-06-22 NOTE — PROGRESS NOTES
[id number] attempting to locate FHR.  Pt position changed, panis lifted without consistant FHR. Pt. To a left tilt then FHR found. Pt. States she is feeling UC into her groin

## 2018-06-22 NOTE — ANESTHESIA PROCEDURE NOTES
Peripheral Nerve Block Procedure Note    Staff:     Anesthesiologist:  GILL STEWART    Resident/CRNA:  VICKIE VALADEZ    Block performed by resident/CRNA in the presence of a teaching physician    Location: OB  Procedure Start/Stop TImes:     patient identified, IV checked, site marked, risks and benefits discussed, informed consent, monitors and equipment checked, pre-op evaluation, at physician/surgeon's request and post-op pain management      Correct Patient: Yes      Correct Position: Yes      Correct Site: Yes      Correct Procedure: Yes      Correct Laterality:  Yes    Site Marked:  Yes  Procedure details:     Procedure:  TAP    ASA:  3    Diagnosis:  Post operative pain    Laterality:  Bilateral    Position:  Supine    Sterile Prep: chloraprep      Needle:  Touhy needle    Needle gauge:  21    Needle length (inches):  3.13    Ultrasound: Yes      Ultrasound used to identify targeted nerve, plexus, or vascular structure and placed a needle adjacent to it      A permanent image is NOT entered into the patient's record.      Abnormal pain on injection: No      Blood Aspirated: No      Paresthesias:  No    Bleeding at site: No      Infusion Method:  Single Shot    Complications:  None

## 2018-06-22 NOTE — PLAN OF CARE
Problem: Patient Care Overview  Goal: Plan of Care/Patient Progress Review  Outcome: Improving   section at 1352 baby girl. Skin to skin with mother and now dad is holding baby.

## 2018-06-22 NOTE — PLAN OF CARE
Problem:  Delivery (Adult,Obstetrics,Pediatric)  Goal: Signs and Symptoms of Listed Potential Problems Will be Absent, Minimized or Managed ( Delivery)  Signs and symptoms of listed potential problems will be absent, minimized or managed by discharge/transition of care (reference  Delivery (Adult,Obstetrics,Pediatric) CPG).  Outcome: Improving  Baby girl delivered at 1352 via  section. Skin to skin done, father now holding baby.

## 2018-06-22 NOTE — IP AVS SNAPSHOT
MRN:[id number]                      After Visit Summary   2018    Keysha Dixon Caro    MRN: [id number]           Thank you!     Thank you for choosing [facility] for your care. Our goal is always to provide you with excellent care. Hearing back from our patients is one way we can continue to improve our services. Please take a few minutes to complete the written survey that you may receive in the mail after you visit with us. Thank you!        Patient Information     Date Of Birth          1990        About your hospital stay     You were [facility] on:  2018 [facility]    You were discharged on:  2018        Reason for your hospital stay       Maternity care                  Who to Call     For medical emergencies, please call 911.  For non-urgent questions about your medical care, please call your primary care provider or clinic, None  For questions related to your surgery, please call your surgery clinic        Attending Provider     Provider Merry Guerrero MD OB/Gyn    Ela Horowitz MD OB/Gyn       Primary Care Provider Fax #    Physician No Ref-Primary [phone]      After Care Instructions     Activity       Review discharge instructions            Diet       Resume previous diet            Discharge Instructions - Postpartum visit       Schedule postpartum visit with your provider and return to clinic in 6 weeks.                  Follow-up Appointments     Follow Up and recommended labs and tests       Follow up with primary care provider at post-partum week 6 for Hemoglobin level.                  Further instructions from your care team       Postop  Birth Instructions    Activity       Do not lift more than 10 pounds for 6 weeks after surgery.  Ask family and friends for help when you need it.    No driving until you have stopped taking your pain medications (usually two weeks after surgery).    No  heavy exercise or activity for 6 weeks.  Don't do anything that will put a strain on your surgery site.    Don't strain when using the toilet.  Your care team may prescribe a stool softener if you have problems with your bowel movements.     To care for your incision:       Keep the incision clean and dry.    Do not soak your incision in water. No swimming or hot tubs until it has fully healed. You may soak in the bathtub if the water level is below your incision.    Do not use peroxide, gel, cream, lotion, or ointment on your incision.    Adjust your clothes to avoid pressure on your surgery site (check the elastic in your underwear for example).     You may see a small amount of clear or pink drainage and this is normal.  Check with your health care provider:       If the drainage increases or has an odor.    If the incision reddens, you have swelling, or develop a rash.    If you have increased pain and the medicine we prescribed doesn't help.    If you have a fever above 100.4 F (38 C) with or without chills when placing thermometer under your tongue.   The area around your incision (surgery wound), will feel numb.  This is normal. The numbness should go away in less than a year.     Keep your hands clean:  Always wash your hands before touching your incision (surgery wound). This helps reduce your risk of infection. If your hands aren't dirty, you may use an alcohol hand-rub to clean your hands. Keep your nails clean and short.    Call your healthcare provider if you have any of these symptoms:       You soak a sanitary pad with blood within 1 hour, or you see blood clots larger than a golf ball.    Bleeding that lasts more than 6 weeks.    Vaginal discharge that smells bad.    Severe pain, cramping or tenderness in your lower belly area.    A need to urinate more frequently (use the toilet more often), more urgently (use the toilet very quickly), or it burns when you urinate.    Nausea and  vomiting.    Redness, swelling or pain around a vein in your leg.    Problems breastfeeding or a red or painful area on your breast.    Chest pain and cough or are gasping for air.    Problems with coping with sadness, anxiety or depression. If you have concerns about hurting yourself or the baby, call your provider immediately.      You have questions or concerns after you return home.                  Pending Results     No orders found from 6/20/2018 to 6/23/2018.            Statement of Approval     Ordered          06/25/18 1105  I have reviewed and agree with all the recommendations and orders detailed in this document.  EFFECTIVE NOW     Approved and electronically signed by:  Alyssa Wayne MD             Admission Information     Date & Time Provider Department Dept. Phone    6/22/2018 Ela Horowitz MD [facility] [phone]      Your Vitals Were     Blood Pressure Pulse Temperature Respirations Last Period Pulse Oximetry    130/91 107 98.4  F (36.9  C) (Oral) 16 09/23/2017 97%      MyChart Information     [employer] gives you secure access to your electronic health record. If you see a primary care provider, you can also send messages to your care team and make appointments. If you have questions, please call your primary care clinic.  If you do not have a primary care provider, please call [phone] and they will assist you.        Care EveryWhere ID     This is your Care EveryWhere ID. This could be used by other organizations to access your [facility] medical records  [id number]        Equal Access to Services     [facility] : Hadii ayla lyleo Somiguel, waaxda luqadaha, qaybta kaalmada adeegyada, dar powell . [facility] [phone].    ATENCIÓN: Si habla español, tiene a hughes disposición servicios gratuitos de asistencia lingüística. Llame al [phone].    We comply with applicable federal civil rights laws and [place] laws. We do not discriminate  on the basis of race, color, national origin, age, disability, sex, sexual orientation, or gender identity.               Review of your medicines      START taking        Dose / Directions    acetaminophen 325 MG tablet   Commonly known as:  TYLENOL        Dose:  650 mg   Take 2 tablets (650 mg) by mouth every 4 hours as needed for other (multimodal surgical pain management along with NSAIDS and opioid medication as indicated based on pain control and physical function.)   Quantity:  30 tablet   Refills:  0       ferrous sulfate 325 (65 Fe) MG tablet   Commonly known as:  IRON   Used for:  Postpartum anemia        Dose:  325 mg   Take 1 tablet (325 mg) by mouth daily (with breakfast)   Quantity:  30 tablet   Refills:  2       ibuprofen 600 MG tablet   Commonly known as:  ADVIL/MOTRIN        Dose:  600 mg   Take 1 tablet (600 mg) by mouth every 6 hours as needed for other (cramping)   Quantity:  30 tablet   Refills:  0       oxyCODONE IR 5 MG tablet   Commonly known as:  ROXICODONE        Dose:  5 mg   Take 1 tablet (5 mg) by mouth every 3 hours as needed for breakthrough pain (pain control or improvement in physical function. Hold dose for analgesic side effects.)   Quantity:  24 tablet   Refills:  0       senna-docusate 8.6-50 MG per tablet   Commonly known as:  SENOKOT-S;PERICOLACE        Dose:  1 tablet   Take 1 tablet by mouth 2 times daily   Quantity:  40 tablet   Refills:  0         CONTINUE these medicines which have NOT CHANGED        Dose / Directions    ASPIRIN ADULT LOW STRENGTH PO   Indication:  pregnancy        Dose:  81 mg   Take 81 mg by mouth daily   Refills:  0       doxylamine 25 MG Tabs tablet   Commonly known as:  UNISOM        Dose:  25 mg   Take 25 mg by mouth At Bedtime   Refills:  0       FLONASE NA        Refills:  0       Prenatal Vitamins 28-0.8 MG Tabs        1 Tab daily. chewables   Refills:  0            Where to get your medicines      Some of these will need a paper prescription and  others can be bought over the counter. Ask your nurse if you have questions.     Bring a paper prescription for each of these medications     ferrous sulfate 325 (65 Fe) MG tablet    oxyCODONE IR 5 MG tablet    senna-docusate 8.6-50 MG per tablet       You don't need a prescription for these medications     acetaminophen 325 MG tablet    ibuprofen 600 MG tablet                Protect others around you: Learn how to safely use, store and throw away your medicines at www.disposemymeds.org.        Information about OPIOIDS     PRESCRIPTION OPIOIDS: WHAT YOU NEED TO KNOW   We gave you an opioid (narcotic) pain medicine. It is important to manage your pain, but opioids are not always the best choice. You should first try all the other options your care team gave you. Take this medicine for as short a time (and as few doses) as possible.     These medicines have risks:    DO NOT drive when on new or higher doses of pain medicine. These medicines can affect your alertness and reaction times, and you could be arrested for driving under the influence (DUI). If you need to use opioids long-term, talk to your care team about driving.    DO NOT operate heave machinery    DO NOT do any other dangerous activities while taking these medicines.     DO NOT drink any alcohol while taking these medicines.      If the opioid prescribed includes acetaminophen, DO NOT take with any other medicines that contain acetaminophen. Read all labels carefully. Look for the word  acetaminophen  or  Tylenol.  Ask your pharmacist if you have questions or are unsure.    You can get addicted to pain medicines, especially if you have a history of addiction (chemical, alcohol or substance dependence). Talk to your care team about ways to reduce this risk.    Store your pills in a secure place, locked if possible. We will not replace any lost or stolen medicine. If you don t finish your medicine, please throw away (dispose) as directed by your  pharmacist. The [place] Pollution Control Agency has more information about safe disposal: https://www.pca.[facility].[place].us/living-green/managing-unwanted-medications.     All opioids tend to cause constipation. Drink plenty of water and eat foods that have a lot of fiber, such as fruits, vegetables, prune juice, apple juice and high-fiber cereal. Take a laxative (Miralax, milk of magnesia, Colace, Senna) if you don t move your bowels at least every other day.              Medication List: This is a list of all your medications and when to take them. Check marks below indicate your daily home schedule. Keep this list as a reference.      Medications           Morning Afternoon Evening Bedtime As Needed    acetaminophen 325 MG tablet   Commonly known as:  TYLENOL   Take 2 tablets (650 mg) by mouth every 4 hours as needed for other (multimodal surgical pain management along with NSAIDS and opioid medication as indicated based on pain control and physical function.)   Last time this was given:  975 mg on 6/25/2018  6:08 AM                                ASPIRIN ADULT LOW STRENGTH PO   Take 81 mg by mouth daily                                doxylamine 25 MG Tabs tablet   Commonly known as:  UNISOM   Take 25 mg by mouth At Bedtime                                ferrous sulfate 325 (65 Fe) MG tablet   Commonly known as:  IRON   Take 1 tablet (325 mg) by mouth daily (with breakfast)                                FLONASE NA                                ibuprofen 600 MG tablet   Commonly known as:  ADVIL/MOTRIN   Take 1 tablet (600 mg) by mouth every 6 hours as needed for other (cramping)   Last time this was given:  800 mg on 6/25/2018 10:09 AM                                oxyCODONE IR 5 MG tablet   Commonly known as:  ROXICODONE   Take 1 tablet (5 mg) by mouth every 3 hours as needed for breakthrough pain (pain control or improvement in physical function. Hold dose for analgesic side effects.)   Last time this was  given:  10 mg on 6/25/2018 10:08 AM                                Prenatal Vitamins 28-0.8 MG Tabs   1 Tab daily. chewables                                senna-docusate 8.6-50 MG per tablet   Commonly known as:  SENOKOT-S;PERICOLACE   Take 1 tablet by mouth 2 times daily   Last time this was given:  1 tablet on 6/25/2018  9:14 AM

## 2018-06-22 NOTE — BRIEF OP NOTE
[facility]   Brief Operative Note     Surgery Date: 2018    Surgeon:  Ela Horowitz     Assistants:  MD Liseth Pascual MD, PGY-2    Pre-op Diagnosis:    -  at 38w6d  - History of  section followed by successful   - Unstable fetal lie, currently breech  - Obesity: BMI 54    Post-op Diagnosis:    - Same as above now  s/p procedure below     Procedure:    - Repeat low-transverse  section with double layer uterine closure via pfannenstiel incision  - Extensive lysis of adhesions     Anesthesia: spinal     EBL: 892 cc    IVF:  1000 cc crystalloid    UOP:  250 cc clear urine at the end of the case    Drains: Wesley Catheter     Specimens: cord blood, cord segment    Complications: None apparent    Findings:   1. Moderate subcutaneous scarring. Rectus diastasis, with rectus, peritoneum, and omentum all scarred as one to the anterior abdominal wall. Vesicouterine peritoneum adherent to 2/3 of the anterior surface of uterus, well above area of previous uterine scar. Thin lower uterine segment above level of previous uterine scar. Normal appearing fallopian tubes and ovaries.   2. Clear amniotic fluid  3. Liveborn female infant in breech presentation at 13:52 on 18. Apgars 9 at 1 minute & 9 at 5 minutes. Weight 3345 grams.      Disposition: Transferred in stable condition to JAY Macias MD  OBGYN PGY-2  3:03 PM 2018

## 2018-06-22 NOTE — PLAN OF CARE
Problem: Patient Care Overview  Goal: Plan of Care/Patient Progress Review  Outcome: No Change  VSS.  FHT- category 1.  IV fluid bolus given.  Plan to monitor pt and proceed with c/s should pt progress in labor.

## 2018-06-22 NOTE — TELEPHONE ENCOUNTER
Dr Lopez paged at 3:55am  Keysha Lacie, 1990,  [id number]  Third baby. due . Contractions every 5-7 minutes. Scheduled  2018.  [phone].  Jelly Chopra will call back at 4:10 am if she hasn't received a call back yet to ask for a second page.  Jelly SOUTH RN [facility]

## 2018-06-22 NOTE — ANESTHESIA PROCEDURE NOTES
Spinal/LP Procedure Note    Spinal Block  Staff:     Anesthesiologist:  GILL STEWART    Resident/CRNA:  VICKIE VALADEZ    Spinal/LP performed by resident/CRNA in presence of a teaching physician.    Location: OR  Procedure Start/Stop Times:      6/22/2018 12:45 PM     6/22/2018 1:00 PM    patient identified, IV checked, site marked, risks and benefits discussed, informed consent, monitors and equipment checked, pre-op evaluation, at physician/surgeon's request and post-op pain management      Correct Patient: Yes      Correct Position: Yes      Correct Site: Yes      Correct Procedure: Yes      Correct Laterality:  Yes    Site Marked:  Yes  Procedure:     Procedure:  Intrathecal    ASA:  3    Position:  Sitting    Sterile Prep: chloraprep, mask, sterile gloves and patient draped      Insertion site:  L3-4    Approach:  Midline    Needle Type:  Ese    Local Skin Infiltration:  2% lidocaine    amount (ml):  6    Needle Length (in):  4    Introducer used: Yes      Introducer gauge:  20 G    Attempts:  2    Redirects:  0    CSF:  Clear    Paresthesias:  No  Assessment/Narrative:     Sensory Level:  T4     First attempt complicated by near syncopal event and was aborted

## 2018-06-22 NOTE — PROVIDER NOTIFICATION
06/22/18 1710   Provider Notification   Provider Name/Title Gilberto   Method of Notification In Department   Request Evaluate-Remote   Notification Reason Medication Request;Status Update     Requested benadryl for itching, notified re: 25cc urine output during PACU. Bolus to be ordered. Updated transferring RNTerra, at bedside.

## 2018-06-23 LAB — HGB BLD-MCNC: 9 G/DL (ref 11.7–15.7)

## 2018-06-23 PROCEDURE — 36415 COLL VENOUS BLD VENIPUNCTURE: CPT | Performed by: STUDENT IN AN ORGANIZED HEALTH CARE EDUCATION/TRAINING PROGRAM

## 2018-06-23 PROCEDURE — 12000030 ZZH R&B OB INTERMEDIATE UMMC

## 2018-06-23 PROCEDURE — 25000132 ZZH RX MED GY IP 250 OP 250 PS 637: Performed by: STUDENT IN AN ORGANIZED HEALTH CARE EDUCATION/TRAINING PROGRAM

## 2018-06-23 PROCEDURE — 85018 HEMOGLOBIN: CPT | Performed by: STUDENT IN AN ORGANIZED HEALTH CARE EDUCATION/TRAINING PROGRAM

## 2018-06-23 RX ORDER — FERROUS SULFATE 325(65) MG
325 TABLET ORAL
Qty: 30 TABLET | Refills: 2 | Status: SHIPPED | OUTPATIENT
Start: 2018-06-23 | End: 2019-08-02

## 2018-06-23 RX ORDER — IBUPROFEN 600 MG/1
600 TABLET, FILM COATED ORAL EVERY 6 HOURS PRN
Qty: 30 TABLET | Refills: 0 | Status: SHIPPED | OUTPATIENT
Start: 2018-06-23 | End: 2018-06-25

## 2018-06-23 RX ORDER — OXYCODONE HYDROCHLORIDE 5 MG/1
5 TABLET ORAL
Qty: 15 TABLET | Refills: 0 | Status: SHIPPED | OUTPATIENT
Start: 2018-06-23 | End: 2018-06-25

## 2018-06-23 RX ORDER — AMOXICILLIN 250 MG
1 CAPSULE ORAL 2 TIMES DAILY
Qty: 40 TABLET | Refills: 0 | Status: SHIPPED | OUTPATIENT
Start: 2018-06-23 | End: 2019-08-02

## 2018-06-23 RX ORDER — ACETAMINOPHEN 325 MG/1
650 TABLET ORAL EVERY 4 HOURS PRN
Qty: 30 TABLET | Refills: 0 | Status: SHIPPED | OUTPATIENT
Start: 2018-06-25 | End: 2018-06-25

## 2018-06-23 RX ADMIN — SENNOSIDES AND DOCUSATE SODIUM 1 TABLET: 8.6; 5 TABLET ORAL at 09:28

## 2018-06-23 RX ADMIN — IBUPROFEN 800 MG: 800 TABLET, FILM COATED ORAL at 09:28

## 2018-06-23 RX ADMIN — OXYCODONE HYDROCHLORIDE 5 MG: 5 TABLET ORAL at 17:44

## 2018-06-23 RX ADMIN — DIPHENHYDRAMINE HYDROCHLORIDE 25 MG: 25 CAPSULE ORAL at 03:28

## 2018-06-23 RX ADMIN — ACETAMINOPHEN 975 MG: 325 TABLET, FILM COATED ORAL at 21:22

## 2018-06-23 RX ADMIN — ACETAMINOPHEN 975 MG: 325 TABLET, FILM COATED ORAL at 03:00

## 2018-06-23 RX ADMIN — IBUPROFEN 800 MG: 800 TABLET, FILM COATED ORAL at 15:59

## 2018-06-23 RX ADMIN — OXYCODONE HYDROCHLORIDE 10 MG: 5 TABLET ORAL at 21:22

## 2018-06-23 RX ADMIN — ACETAMINOPHEN 975 MG: 325 TABLET, FILM COATED ORAL at 11:15

## 2018-06-23 NOTE — PLAN OF CARE
Transferred to [facility] with baby and spouse. Orientation to [facility]/room with pt and spouse. Bands checked with YT and MD. Bedside report given by Jessica. Call light within reach.

## 2018-06-23 NOTE — PROVIDER NOTIFICATION
06/22/18 2127   Provider Notification   Provider Name/Title Clyde G3   Method of Notification Electronic Page   Request Evaluate-Remote   Notification Reason Status Update  (FYI pt's  for last 4.5hr. Bolus finished. Emesis x2)   2129 Dr. Tang called back. Said to start D5 at end of pit and continue to monitor UO and nausea

## 2018-06-23 NOTE — PROVIDER NOTIFICATION
06/22/18 2014   Provider Notification   Provider Name/Title Amandeep G3   Method of Notification Electronic Page   Request Evaluate-Remote   Notification Reason Medication Request;Status Update  (pt vomited benedryl pills;[id number] benedryl IV, zofran)   Pg to Clyde, G3 (not Amandeep);   Pt took 25mg benedryl PO at 1900; per pt report, vomited pills; requesting 25mg benedryl IV and 4mg Zofran (had 4mg zofran last at 1700).     Update: 2022 Priya ARTEAGA RN called; said Dr. Tang is in OR but gives verbal permission to order 25mg benadryl IV and 4mg Zofran IV

## 2018-06-23 NOTE — PLAN OF CARE
Problem: Patient Care Overview  Goal: Plan of Care/Patient Progress Review  Outcome: Improving  VSS. Postpartum checks WDL. Pain managed with Ibuprofen, Tylenol, and Tap block. Up ad vicente. Tolerating regular diet. IV removed. Voiding without difficulty. Passing gas and had soft stool/diarrhea.  Breastfeeding with minimal assist for positioning.  at bedside and supportive. Continue cares.

## 2018-06-23 NOTE — PLAN OF CARE
"Problem: Patient Care Overview  Goal: Plan of Care/Patient Progress Review  Outcome: Improving  VSS. Pt reporting nausea earlier in evening; had emesis x2, mostly clear fluids/mucus. Received zofran; reports \"feeling better and less nauseas.\" Breastfeeding with minimal assist from staff. Pt reporting dull pain on left side of incision; pain controlled with tylenol. Pt also reporting intense itching; itching controlled with benadryl and lotion. UO was a little low earlier in evening; bolus given; currently UO is adequate. Wesley removed at 0540. Awaiting first void post Wesley removal. Ambulating independently.  Dad at bedside for support. Continue with plan of care.       "

## 2018-06-23 NOTE — PLAN OF CARE
Problem: Patient Care Overview  Goal: Plan of Care/Patient Progress Review  Outcome: No Change  VSS, except for tachycardia. Postpartum checks WDL. Coughing occasionally. Had two occurrences of nausea and vomiting this evening; declined med and wanted to do crackers. Dangled and stood at bedside; tolerated well. LR Bolus started for low urine output.  at bedside and supportive. Continue cares.

## 2018-06-23 NOTE — PROGRESS NOTES
Post Partum Progress Note    Subjective:  Patient is doing well.  No complaints. Minimal lochia.  Pain well controlled on pain meds.  Tolerating PO and ambulating without any issues.  Denies any fever, chills, SOB, chest pain, N/V,  headache, dizziness.  Planning on breast feeding.  Would like an IUD for birth control.    Objective:  Patient Vitals for the past 24 hrs:   BP Temp Temp src Heart Rate Resp SpO2   18 0520 106/63 - - 87 16 95 %   18 0157 122/79 97.7  F (36.5  C) Oral 97 18 96 %   18 2157 117/79 98.2  F (36.8  C) Oral 92 18 96 %   18 2053 125/89 - - 89 18 96 %   18 1956 123/85 97.9  F (36.6  C) Oral 96 18 96 %   18 1851 (!) 132/92 98.1  F (36.7  C) Oral 112 20 96 %   18 1759 (!) 116/98 98.1  F (36.7  C) Oral 104 20 96 %   18 1752 - - - - - 98 %   18 1734 122/84 98.1  F (36.7  C) Oral 94 20 96 %   18 1730 - - - - 20 -   18 1656 120/79 - - 103 24 95 %   18 1640 (!) 118/92 - - 119 30 96 %   18 1627 118/80 - - 98 14 96 %   18 1612 116/86 - - 118 25 96 %   18 1555 116/88 - - 112 25 96 %   18 1550 - - - - - 96 %   18 1540 119/82 - - 112 26 91 %   18 1530 127/79 - - 108 (!) 41 93 %   18 1525 127/79 - - 97 (!) 31 93 %   18 1505 108/78 98.1  F (36.7  C) Oral - 18 93 %       General: AAOx3, NAD, appears generally well  Resp:  Breathing comfortably on room air  Abd:  Soft, nontender, nondistended, fundus firm at the umbilicus  Incision: clean dressing in place  Ext:  SCDs on, trace edema in bilateral LE      Assessment and Plan:  27 year old old  post-partum day 1 s/p RLTCS for breech presentation in labor. Doing well in the postoperative period.    -Pain: PO tylenol, ibuprofen, oxycodone  -Morbid obesity, BMI 54. Consider prophylactic lovenox if patient not ambulating today.  -Low UOP in afternoon/evening of POD#0, now improving after 1L IV fluid bolus  -Hgb 10.3 >  > 9. Acute blood  loss from surgery, expected in addition to chronic anemia. Plan to discharge with PO iron supplementation.   -Rh positive; no rhogam needed  -Rubella immune; no MMR needed (per Care Everywhere)  -Breast feeding  -IUD for birth control  -anticipate d/c to home POD#3    # Pain Assessment:  Current Pain Score 2018   Patient currently in pain? denies   Pain score (0-10) -   Pain location -   Pain descriptors -   - Keysha is experiencing pain due to recent . Pain management was discussed and the plan was created in a collaborative fashion.  Keysha's response to the current recommendations: engaged  - Please see the plan for pain management as documented above    Rita Tang, PGY3  OB/GYN Resident  2018      Physician Attestation   I, Cristina Boone, personally examined and evaluated this patient.  I discussed the patient with the medical student and/or resident and care team, and agree with the assessment and plan of care as documented in the note of 2018  [date].      I personally reviewed vital signs, medications, labs and exam.    Key findings: doing well on POD 1 s/p RLTCS at 38w6d for labor and breech.   A few mildly elevated diastolic BPs but no s/sx pre-eclampsia. Normal BPs in past 12h. Working on breastfeeding. Encourage more ambulation today. Anticipate discharge to home on POD 3.  Cristina Boone MD  Date of Service (when I saw the patient): 18

## 2018-06-24 PROCEDURE — 25000132 ZZH RX MED GY IP 250 OP 250 PS 637: Performed by: STUDENT IN AN ORGANIZED HEALTH CARE EDUCATION/TRAINING PROGRAM

## 2018-06-24 PROCEDURE — 12000028 ZZH R&B OB UMMC

## 2018-06-24 RX ADMIN — ACETAMINOPHEN 975 MG: 325 TABLET, FILM COATED ORAL at 13:56

## 2018-06-24 RX ADMIN — ACETAMINOPHEN 975 MG: 325 TABLET, FILM COATED ORAL at 21:52

## 2018-06-24 RX ADMIN — IBUPROFEN 800 MG: 800 TABLET, FILM COATED ORAL at 01:10

## 2018-06-24 RX ADMIN — ACETAMINOPHEN 975 MG: 325 TABLET, FILM COATED ORAL at 06:23

## 2018-06-24 RX ADMIN — OXYCODONE HYDROCHLORIDE 10 MG: 5 TABLET ORAL at 10:18

## 2018-06-24 RX ADMIN — OXYCODONE HYDROCHLORIDE 10 MG: 5 TABLET ORAL at 21:52

## 2018-06-24 RX ADMIN — OXYCODONE HYDROCHLORIDE 10 MG: 5 TABLET ORAL at 15:29

## 2018-06-24 RX ADMIN — OXYCODONE HYDROCHLORIDE 10 MG: 5 TABLET ORAL at 01:11

## 2018-06-24 RX ADMIN — OXYCODONE HYDROCHLORIDE 10 MG: 5 TABLET ORAL at 18:55

## 2018-06-24 RX ADMIN — OXYCODONE HYDROCHLORIDE 10 MG: 5 TABLET ORAL at 06:23

## 2018-06-24 RX ADMIN — IBUPROFEN 800 MG: 800 TABLET, FILM COATED ORAL at 15:29

## 2018-06-24 RX ADMIN — IBUPROFEN 800 MG: 800 TABLET, FILM COATED ORAL at 09:21

## 2018-06-24 NOTE — PLAN OF CARE
Problem: Patient Care Overview  Goal: Plan of Care/Patient Progress Review  Outcome: Improving  Data: Vital signs within normal limits. Postpartum checks within normal limits - see flow record. Patient eating and drinking normally. Patient able to empty bladder independently and is up ambulating. No apparent signs of infection. Incision healing well. Patient performing self cares and is able to care for infant.  Action: Patient medicated during the shift for pain. See MAR. Patient reassessed within 1 hour after each medication and pain was improved - patient stated she was comfortable. Patient education done about pain management, hand expression, and clusterfeeding. See flow record.  Response: Positive attachment behaviors observed with infant. Support persons present.   Plan: Anticipate discharge tomorrow.

## 2018-06-24 NOTE — PLAN OF CARE
Problem: Patient Care Overview  Goal: Plan of Care/Patient Progress Review  Outcome: Improving  Data: Vital signs within normal limits. Postpartum checks within normal limits - see flow record. Patient eating and drinking normally. Patient able to empty bladder independently and is up ambulating. No apparent signs of infection. Incision healing well. Patient performing self cares and is able to care for infant.  Action: Patient medicated during the shift for cramping. See MAR. Patient education done about infant bathing and pain management. See flow record.  Response: Positive attachment behaviors observed with infant. Father and family present.   Plan: Anticipate discharge tomorrow.

## 2018-06-24 NOTE — PROGRESS NOTES
OB Postpartum Progress Note    S: Feeling well this morning. Pain well controlled, on PO meds. Lochia improving. No headache, vision changes, CP, SOB. Tolerating regular diet without nausea or emesis, passing flatus and BM. Ambulating without dizziness or lightheadedness. Voiding spontaneously without issues.  Breastfeeding going well. Mood is good.     O:    Vitals:    18 1345 18 1553 18 2155 18 0156   BP: 133/85 114/73 128/89 128/81   Pulse:   107 105   Resp:    Temp: 97.9  F (36.6  C) 98.2  F (36.8  C) 97.7  F (36.5  C) 98.3  F (36.8  C)   TempSrc: Oral Oral Oral Oral   SpO2: 97%        Gen: NAD. Alert, oriented. Resting comfortably in bed.  CV: regular rate  Resp: regular rate, no increased work of breathing  Abd: Soft, appropriately tender, fundus firm at umbilicus, appropriately tender  Incision: c/d/i, no erythema or induration, no active drainage, steristrips in place   Ext: no lower extremity edema bilaterally     Labs:   Hemoglobin   Date Value Ref Range Status   2018 9.0 (L) 11.7 - 15.7 g/dL Final   2018 10.3 (L) 11.7 - 15.7 g/dL Final       A/P: Keysha Dixon Caro is a 27 year old  who is POD#2 s/p rLTCS for breech presentation in labor.  Doing well postpartum; without issues. Questions and concerns addressed.     - Pain: Well controlled on PO meds  - Routine post-op cares - encouraged ambulation to help with bowel function/gas pain. May use simethicone prn for gas pain  - Heme: Hgb as above. . Fe is indicated at discharge. Currently asymptomatic  - Rh+/RI   - Breast feeding; no issues  - Discussed recommended 18 month spacing prior to next pregnancy. IUD contraception  - Anticipate discharge POD#3; once meeting postpartum discharge goals: voiding & ambulating without difficulty, tolerating a regular diet without nausea and vomiting, passing flatus, pain well controlled on oral pain medicines, lochia appropriate, afebrile with vital signs within  normal limits.     Meryl Sheth MD  OB/GYN Resident G1  6/24/2018 8:02 AM     Physician Attestation   I, Ela Horowitz, personally examined and evaluated this patient.  I discussed the patient with the medical student and/or resident and care team, and agree with the assessment and plan of care as documented in the note of June 24, 2018  [date].      I personally reviewed vital signs, medications and labs.    Key findings: patient doing well, bowel and bladder working well.  Will be ready for d/c tomorrow.   Ela Horowitz MD  Date of Service (when I saw the patient): 06/24/18

## 2018-06-24 NOTE — PLAN OF CARE
Problem: Patient Care Overview  Goal: Plan of Care/Patient Progress Review  Outcome: Improving  VSS. Bonding well with baby. Breastfeeding with no assist from staff; baby cluster feeding overnight. Incisional closed with staples; open to air; interdry placed in panus. Reports some incisional pain; taking tylenol, ibuprofen, and oxycodone. Ambulating independently. Voiding without difficulty. Dad at bedside for support. Continue with plan of care.

## 2018-06-25 VITALS
DIASTOLIC BLOOD PRESSURE: 91 MMHG | TEMPERATURE: 98.4 F | HEART RATE: 107 BPM | SYSTOLIC BLOOD PRESSURE: 130 MMHG | OXYGEN SATURATION: 97 % | RESPIRATION RATE: 16 BRPM

## 2018-06-25 PROCEDURE — 25000132 ZZH RX MED GY IP 250 OP 250 PS 637: Performed by: STUDENT IN AN ORGANIZED HEALTH CARE EDUCATION/TRAINING PROGRAM

## 2018-06-25 RX ORDER — OXYTOCIN/0.9 % SODIUM CHLORIDE 30/500 ML
PLASTIC BAG, INJECTION (ML) INTRAVENOUS
Status: DISPENSED
Start: 2018-06-25 | End: 2018-06-25

## 2018-06-25 RX ORDER — ACETAMINOPHEN 325 MG/1
650 TABLET ORAL EVERY 4 HOURS PRN
Qty: 30 TABLET | Refills: 0 | COMMUNITY
Start: 2018-06-25

## 2018-06-25 RX ORDER — OXYCODONE HYDROCHLORIDE 5 MG/1
5 TABLET ORAL
Qty: 24 TABLET | Refills: 0 | Status: SHIPPED | OUTPATIENT
Start: 2018-06-25 | End: 2019-08-02

## 2018-06-25 RX ORDER — IBUPROFEN 600 MG/1
600 TABLET, FILM COATED ORAL EVERY 6 HOURS PRN
Qty: 30 TABLET | Refills: 0 | COMMUNITY
Start: 2018-06-25 | End: 2019-08-02

## 2018-06-25 RX ORDER — BUPIVACAINE HYDROCHLORIDE 7.5 MG/ML
INJECTION, SOLUTION INTRASPINAL
Status: DISPENSED
Start: 2018-06-25 | End: 2018-06-25

## 2018-06-25 RX ADMIN — SENNOSIDES AND DOCUSATE SODIUM 1 TABLET: 8.6; 5 TABLET ORAL at 09:14

## 2018-06-25 RX ADMIN — OXYCODONE HYDROCHLORIDE 10 MG: 5 TABLET ORAL at 01:06

## 2018-06-25 RX ADMIN — OXYCODONE HYDROCHLORIDE 10 MG: 5 TABLET ORAL at 06:07

## 2018-06-25 RX ADMIN — ACETAMINOPHEN 975 MG: 325 TABLET, FILM COATED ORAL at 06:08

## 2018-06-25 RX ADMIN — IBUPROFEN 800 MG: 800 TABLET, FILM COATED ORAL at 10:09

## 2018-06-25 RX ADMIN — IBUPROFEN 800 MG: 800 TABLET, FILM COATED ORAL at 01:06

## 2018-06-25 RX ADMIN — OXYCODONE HYDROCHLORIDE 10 MG: 5 TABLET ORAL at 10:08

## 2018-06-25 NOTE — PROGRESS NOTES
Post Partum Progress Note  PPD#3    Subjective:  Keysha is doing well this morning, snuggling with her infant. Has no complaints this morning, pain is getting better, and medications help. She is ambulating and voiding on her own. She is eating without difficulty. She is passing gas, but not yet had a bowel movement. She is planning to go home today      Objective:  Vitals:    18 0800 18 1519 18 2154 18 0108   BP: 135/81 (!) 139/91 127/90 133/90   Pulse: 85 104 104 80   Resp:  16 16   Temp: 97.7  F (36.5  C) 97.7  F (36.5  C) 98  F (36.7  C) 98  F (36.7  C)   TempSrc: Oral Oral Oral Oral   SpO2:           General: NAD. A&Ox3.  CV: Regular rate, well perfused.   Pulm: Normal respiratory effort.  Abd: Soft, non-tender, non-distended. Fundus is firm and 2 cm below the umbilicus.    Incision: incision is clean, dry, intact. Staples in place  Ext: 1+ lower extremity edema bilaterally. No calf tenderness.    Assessment/Plan:  Keysha Dixon Caro is a 27 year old  female who is POD#3 s/p RLTCS for breech presentation in labor.    - Encourage routine post-operative goals including ambulation and incentive spirometry  - PNC: Rh pos. Rubella immune. No intervention indicated.  - Pain: controlled on oral medications  - Heme: Hgb 10.3>>9.0. Will discharge home with PO Iron. She denies symptoms of acute blood loss anemia.  - GI: continue anti-emetics and stool softeners as needed.  - : s/p velarde, voiding spontaneously .  - Infant: Stable in room  - Feeding: Plans on breastfeeding.  - BC: Plans on IUD at PP visit    Discharge to home on POD#3, today, as she is meeting all her postoperative goals. Plan to remove staples before discharge. Blood pressures mildly elevated today, will monitor closely with home health nurse    # Pain Assessment:  Current Pain Score 2018   Patient currently in pain? yes   Pain score (0-10) -   Pain location Incision   Pain descriptors Aching;Sore   - Keysha  is experiencing pain due to c/s. Pain management was discussed and the plan was created in a collaborative fashion.  Keysha's response to the current recommendations: engaged  - Opioid regimen: oxycodone PRN  - Response to opioid medications: Reduction of symptoms   - Bowel regimen: senna and docusate  - Pharmacologic adjuvants: NSAIDs and Acetaminophen    Raya Padgett MD  Obstetrics and Gyncology, PGY-2  June 24, 2018 , 8:15 AM         Physician Attestation   I, Alyssa Wayne, saw and evaluated this patient prior to discharge.  I discussed the patient with the resident and/or medical student and agree with plan of care as documented in the note.      I personally reviewed vital signs, medications, labs and exam. Incision CDI.     I personally spent 20 minutes on discharge activities.    Discussed mild range blood pressures. Not in range indicated for treatment. Discussed precautions. Will have nurse visit for BP check and appointment for wound check and BP check in one week.        Alyssa Wayne MD  Date of Service (when I saw the patient): 06/25/18

## 2018-06-25 NOTE — PLAN OF CARE
Problem: Patient Care Overview  Goal: Plan of Care/Patient Progress Review  Outcome: Improving  VSS. Bonding well with baby. Breastfeeding with minimal assist from staff. Reporting incisional soreness and some cramping; pain controlled with tylenol, ibuprofen, and oxycodone. Incision open to air; closed with staples; interdry in place. Ambulating independently. Voiding without difficulty. Dad at bedside for support. Family anticipating discharge today.

## 2018-11-02 ENCOUNTER — OFFICE VISIT (OUTPATIENT)
Dept: URGENT CARE | Facility: URGENT CARE | Age: 28
End: 2018-11-02
Payer: COMMERCIAL

## 2018-11-02 VITALS
HEART RATE: 98 BPM | BODY MASS INDEX: 53.9 KG/M2 | DIASTOLIC BLOOD PRESSURE: 58 MMHG | SYSTOLIC BLOOD PRESSURE: 134 MMHG | WEIGHT: 285.25 LBS | OXYGEN SATURATION: 96 % | TEMPERATURE: 98.9 F

## 2018-11-02 DIAGNOSIS — J22 LOWER RESP. TRACT INFECTION: Primary | ICD-10-CM

## 2018-11-02 DIAGNOSIS — R05.9 COUGH: ICD-10-CM

## 2018-11-02 PROBLEM — E66.01 MORBID OBESITY (H): Status: ACTIVE | Noted: 2018-11-02

## 2018-11-02 PROCEDURE — 99213 OFFICE O/P EST LOW 20 MIN: CPT | Performed by: PHYSICIAN ASSISTANT

## 2018-11-02 NOTE — MR AVS SNAPSHOT
After Visit Summary   11/2/2018    Keysha Dixon Caro    MRN: [id number]           Patient Information     Date Of Birth          1990        Visit Information        Provider Department      11/2/2018 6:25 PM Yas Merritt PA-C [facility]        Today's Diagnoses     Lower resp. tract infection    -  1    Cough           Follow-ups after your visit        Who to contact     If you have questions or need follow up information about today's clinic visit or your schedule please contact [facility] directly at [phone].  Normal or non-critical lab and imaging results will be communicated to you by InExchangehart, letter or phone within 4 business days after the clinic has received the results. If you do not hear from us within 7 days, please contact the clinic through InExchangehart or phone. If you have a critical or abnormal lab result, we will notify you by phone as soon as possible.  Submit refill requests through [email or web address] or call your pharmacy and they will forward the refill request to us. Please allow 3 business days for your refill to be completed.          Additional Information About Your Visit        MyChart Information     [email or web address] gives you secure access to your electronic health record. If you see a primary care provider, you can also send messages to your care team and make appointments. If you have questions, please call your primary care clinic.  If you do not have a primary care provider, please call [phone] and they will assist you.        Care EveryWhere ID     This is your Care EveryWhere ID. This could be used by other organizations to access your [facility] medical records  [id number]        Your Vitals Were     Pulse Temperature Pulse Oximetry Breastfeeding? BMI (Body Mass Index)       98 98.9  F (37.2  C) (Oral) 96% Yes 53.9 kg/m2        Blood Pressure from Last 3 Encounters:   11/02/18 134/58   06/25/18 (!) 130/91   06/20/18 126/72     Weight from Last 3 Encounters:   11/02/18 285 lb 4 oz (129.4 kg)   06/20/18 287 lb 8 oz (130.4 kg)   06/13/18 289 lb 8 oz (131.3 kg)              Today, you had the following     No orders found for display         Today's Medication Changes          These changes are accurate as of 11/2/18  7:24 PM.  If you have any questions, ask your nurse or doctor.               Start taking these medicines.        Dose/Directions    amoxicillin-clavulanate 875-125 MG per tablet   Commonly known as:  AUGMENTIN   Used for:  Cough, Lower resp. tract infection   Started by:  Yas Merritt PA-C        Dose:  1 tablet   Take 1 tablet by mouth 2 times daily   Quantity:  20 tablet   Refills:  0            Where to get your medicines      These medications were sent to [id number] - [place], [place] - [address]  [address] [address]     Phone:  [phone]     amoxicillin-clavulanate 875-125 MG per tablet                Primary Care Provider Fax #    Physician No Ref-Primary [phone]       No address on file        Equal Access to Services     [facility]: Hadii ayla pro hadasho Somiguel, waaxda luqadaha, qaybta kaalmada aderadhames, dar torres. So [facility] [phone].    ATENCIÓN: Si habla español, tiene a hughes disposición servicios gratuitos de asistencia lingüística. Bushra al [phone].    We comply with applicable federal civil rights laws and [place] laws. We do not discriminate on the basis of race, color, national origin, age, disability, sex, sexual orientation, or gender identity.            Thank you!     Thank you for choosing [facility]  for your care. Our goal is always to provide you with excellent care. Hearing back from our patients is one way we can continue to improve our services. Please take a few minutes to complete the written survey that you may receive in the mail after your visit with us. Thank you!              Your Updated Medication List - Protect others around you: Learn how to safely use, store and throw away your medicines at www.disposemymeds.org.          This list is accurate as of 18  7:24 PM.  Always use your most recent med list.                   Brand Name Dispense Instructions for use Diagnosis    acetaminophen 325 MG tablet    TYLENOL    30 tablet    Take 2 tablets (650 mg) by mouth every 4 hours as needed for other (multimodal surgical pain management along with NSAIDS and opioid medication as indicated based on pain control and physical function.)    S/P  section       amoxicillin-clavulanate 875-125 MG per tablet    AUGMENTIN    20 tablet    Take 1 tablet by mouth 2 times daily    Cough, Lower resp. tract infection       ASPIRIN ADULT LOW STRENGTH PO      Take 81 mg by mouth daily        DELSYM COUGH RELIEF MT           doxylamine 25 MG Tabs tablet    UNISOM     Take 25 mg by mouth At Bedtime        ferrous sulfate 325 (65 Fe) MG tablet    IRON    30 tablet    Take 1 tablet (325 mg) by mouth daily (with breakfast)    Postpartum anemia       FLONASE NA           ibuprofen 600 MG tablet    ADVIL/MOTRIN    30 tablet    Take 1 tablet (600 mg) by mouth every 6 hours as needed for other (cramping)    S/P  section       oxyCODONE IR 5 MG tablet    ROXICODONE    24 tablet    Take 1 tablet (5 mg) by mouth every 3 hours as needed for breakthrough pain (pain control or improvement in physical function. Hold dose for analgesic side effects.)    S/P  section       Prenatal Vitamins 28-0.8 MG Tabs      1 Tab daily. chewables        senna-docusate 8.6-50 MG per tablet    SENOKOT-S;PERICOLACE    40 tablet    Take 1 tablet by mouth 2 times daily    S/P  section

## 2018-11-03 NOTE — PROGRESS NOTES
S: 29 yo female here for cough x 2 weeks.    Started with fever up to 102.9 and chills for the first 3 days.  Fever went away but then cough came on.  Had pneumonia in 2012.  Wet cough.  She is currently breast-feeding her 4-month-old.  No rash.  No vomiting.  No ear pain.  Mild sore throat that started today.  No history of asthma.          No Known Allergies    Past Medical History:   Diagnosis Date     Cardiac abnormality      Chronic kidney disease      Hypertension      NO ACTIVE PROBLEMS      Pneumonia 2012     Uncomplicated asthma     no MDI         Current Outpatient Prescriptions on File Prior to Visit:  acetaminophen (TYLENOL) 325 MG tablet Take 2 tablets (650 mg) by mouth every 4 hours as needed for other (multimodal surgical pain management along with NSAIDS and opioid medication as indicated based on pain control and physical function.)   Prenatal Vit-Fe Fumarate-FA (PRENATAL VITAMINS) 28-0.8 MG TABS 1 Tab daily. chewables   ASPIRIN ADULT LOW STRENGTH PO Take 81 mg by mouth daily   doxylamine (UNISOM) 25 MG TABS tablet Take 25 mg by mouth At Bedtime   ferrous sulfate (IRON) 325 (65 Fe) MG tablet Take 1 tablet (325 mg) by mouth daily (with breakfast) (Patient not taking: Reported on 11/2/2018)   Fluticasone Propionate (FLONASE NA)    ibuprofen (ADVIL/MOTRIN) 600 MG tablet Take 1 tablet (600 mg) by mouth every 6 hours as needed for other (cramping) (Patient not taking: Reported on 11/2/2018)   oxyCODONE IR (ROXICODONE) 5 MG tablet Take 1 tablet (5 mg) by mouth every 3 hours as needed for breakthrough pain (pain control or improvement in physical function. Hold dose for analgesic side effects.) (Patient not taking: Reported on 11/2/2018)   senna-docusate (SENOKOT-S;PERICOLACE) 8.6-50 MG per tablet Take 1 tablet by mouth 2 times daily (Patient not taking: Reported on 11/2/2018)     No current facility-administered medications on file prior to visit.     Social History   Substance Use Topics     Smoking  status: Never Smoker     Smokeless tobacco: Never Used     Alcohol use No      Comment: [place]       ROS:  CONSTITUTIONAL: Negative for fatigue or fever.  EYES: Negative for eye problems.  ENT: As above.  RESP: As above.  CV: Negative for chest pains.  GI: Negative for vomiting.  MUSCULOSKELETAL:  Negative for significant muscle or joint pains.  NEUROLOGIC: Negative for headaches.  SKIN: Negative for rash.    OBJECTIVE:  /58 (BP Location: Other (Comment), Patient Position: Chair, Cuff Size: Adult Large)  Pulse 98  Temp 98.9  F (37.2  C) (Oral)  Wt 285 lb 4 oz (129.4 kg)  SpO2 96%  Breastfeeding? Yes  BMI 53.9 kg/m2  GENERAL APPEARANCE: Healthy, alert and no distress.  EYES:Conjunctiva/sclera clear.  EARS: No cerumen.   Ear canals w/o erythema.  TM's intact w/o erythema.    NOSE/MOUTH: Nose without ulcers, erythema or lesions.  SINUSES: No maxillary sinus tenderness.  THROAT: No erythema w/o tonsillar enlargement . No exudates.  NECK: Supple, nontender, no lymphadenopathy.  RESP: Lungs clear to auscultation - no rales, rhonchi or wheezes  CV: Regular rate and rhythm, normal S1 S2, no murmur noted.  NEURO: Awake, alert    SKIN: No rashes      ASSESSMENT:     ICD-10-CM    1. Lower resp. tract infection J22 amoxicillin-clavulanate (AUGMENTIN) 875-125 MG per tablet   2. Cough R05 amoxicillin-clavulanate (AUGMENTIN) 875-125 MG per tablet       PLAN:Follow-up with primary 3 days if not better.  Lots of rest and fluids.  RTC if any worsening symptoms or if not improving.    Yas Merritt PA-C

## 2018-12-05 ENCOUNTER — RADIANT APPOINTMENT (OUTPATIENT)
Dept: GENERAL RADIOLOGY | Facility: CLINIC | Age: 28
End: 2018-12-05
Attending: NURSE PRACTITIONER
Payer: COMMERCIAL

## 2018-12-05 ENCOUNTER — OFFICE VISIT (OUTPATIENT)
Dept: URGENT CARE | Facility: URGENT CARE | Age: 28
End: 2018-12-05
Payer: COMMERCIAL

## 2018-12-05 VITALS
RESPIRATION RATE: 20 BRPM | SYSTOLIC BLOOD PRESSURE: 130 MMHG | BODY MASS INDEX: 54.27 KG/M2 | DIASTOLIC BLOOD PRESSURE: 80 MMHG | TEMPERATURE: 99.3 F | WEIGHT: 287.2 LBS | HEART RATE: 124 BPM | OXYGEN SATURATION: 99 %

## 2018-12-05 DIAGNOSIS — R91.8 OPACITIES OF BOTH LUNGS PRESENT ON CHEST X-RAY: ICD-10-CM

## 2018-12-05 DIAGNOSIS — R05.3 COUGH, PERSISTENT: Primary | ICD-10-CM

## 2018-12-05 PROCEDURE — 71046 X-RAY EXAM CHEST 2 VIEWS: CPT | Mod: FY

## 2018-12-05 PROCEDURE — 99213 OFFICE O/P EST LOW 20 MIN: CPT | Performed by: NURSE PRACTITIONER

## 2018-12-05 RX ORDER — AZITHROMYCIN 250 MG/1
TABLET, FILM COATED ORAL
Qty: 6 TABLET | Refills: 0 | Status: SHIPPED | OUTPATIENT
Start: 2018-12-05 | End: 2019-08-02

## 2018-12-05 RX ORDER — BENZONATATE 200 MG/1
200 CAPSULE ORAL 3 TIMES DAILY PRN
Qty: 21 CAPSULE | Refills: 0 | Status: SHIPPED | OUTPATIENT
Start: 2018-12-05 | End: 2018-12-12

## 2018-12-05 ASSESSMENT — ENCOUNTER SYMPTOMS
DIARRHEA: 0
RHINORRHEA: 0
FEVER: 0
VOMITING: 0
COUGH: 1
CHILLS: 0
SORE THROAT: 0
NAUSEA: 0
HEADACHES: 0
SHORTNESS OF BREATH: 0

## 2018-12-05 NOTE — MR AVS SNAPSHOT
After Visit Summary   12/5/2018    Keysha Dixon Caro    MRN: [id number]           Patient Information     Date Of Birth          1990        Visit Information        Provider Department      12/5/2018 6:40 PM Ramila Barnes NP [facility]        Today's Diagnoses     Cough, persistent    -  1    Opacities of both lungs present on chest x-ray          Care Instructions      Atelectasis    The lungs  job is to get air into and out of the body. Inside the lungs, air travels through a network of branching airways (tubes) made of stretchy tissue. If something blocks these airways, or if there is too much pressure on a lung, the lung may collapse. Partial or full collapse of a lung is called atelectasis. This can cause a drop in oxygen levels. Atelectasis may be due to any of these:    Prolonged bed rest    Pattern of shallow breathing    Heavy sedation    Obesity    A foreign object in one of the air passages    A tumor (benign or malignant) that presses on an air passage    Chest or abdominal pain from surgery, injury, or other condition that may limit the ability to cough or breathe deeply  The treatment for atelectasis depends on the cause.  Home care  If your atelectasis is due to bed rest, sedation, shallow breathing, or obesity, do the following to help increase air flow in your lungs.    If you were given an incentive spirometer device, use it as directed.    If you were not given an incentive spirometer, take 4 very deep breaths every 1 to 2 hours while awake. As you exhale, purse your lips as if you were blowing up a balloon. (If possible, actually blow up a balloon or a rubber glove.)  If atelectasis was due to other causes, follow your healthcare provider s advice for home care.  Follow-up care  Follow up with your healthcare provider, or as advised.  When to seek medical advice  Call your healthcare provider right away if any of these occur:    Cough with congestion or  colored sputum    Development or worsening of abdominal pain    Fever of 100.4 F (38 C) or higher, or as directed by your healthcare provider  Call 911  Call 911 if any of these occur:    Shortness of breath    Chest pain that is worse with breathing    Dizziness, weakness, or fainting    Coughing up blood   Date Last Reviewed: 6/1/2018 2000-2018 [employer]. [address] [place] [address]. All rights reserved. This information is not intended as a substitute for professional medical care. Always follow your healthcare professional's instructions.                Follow-ups after your visit        Who to contact     If you have questions or need follow up information about today's clinic visit or your schedule please contact [facility] directly at [phone].  Normal or non-critical lab and imaging results will be communicated to you by MyChart, letter or phone within 4 business days after the clinic has received the results. If you do not hear from us within 7 days, please contact the clinic through MyChart or phone. If you have a critical or abnormal lab result, we will notify you by phone as soon as possible.  Submit refill requests through Kior or call your pharmacy and they will forward the refill request to us. Please allow 3 business days for your refill to be completed.          Additional Information About Your Visit        Decision Pacehart Information     Kior gives you secure access to your electronic health record. If you see a primary care provider, you can also send messages to your care team and make appointments. If you have questions, please call your primary care clinic.  If you do not have a primary care provider, please call [phone] and they will assist you.        Care EveryWhere ID     This is your Care EveryWhere ID. This could be used by other organizations to access your [facility] medical records  [id number]        Your Vitals Were      Pulse Temperature Respirations Pulse Oximetry BMI (Body Mass Index)       124 99.3  F (37.4  C) (Oral) 20 99% 54.27 kg/m2        Blood Pressure from Last 3 Encounters:   12/05/18 130/80   11/02/18 134/58   06/25/18 (!) 130/91    Weight from Last 3 Encounters:   12/05/18 287 lb 3.2 oz (130.3 kg)   11/02/18 285 lb 4 oz (129.4 kg)   06/20/18 287 lb 8 oz (130.4 kg)              We Performed the Following     XR Chest 2 Views          Today's Medication Changes          These changes are accurate as of 12/5/18  7:41 PM.  If you have any questions, ask your nurse or doctor.               Start taking these medicines.        Dose/Directions    azithromycin 250 MG tablet   Commonly known as:  ZITHROMAX   Used for:  Opacities of both lungs present on chest x-ray   Started by:  Ramila Barnes NP        Two tablets first day, then one tablet daily for four days.   Quantity:  6 tablet   Refills:  0            Where to get your medicines      These medications were sent to [id number] - [place], [place] - [address]  [address] [place] [id number]     Phone:  [phone]     azithromycin 250 MG tablet                Primary Care Provider Fax #    Physician No Ref-Primary [phone]       No address on file        Equal Access to Services     CHHAYA RAINES AH: Jd Alarcon, waaxda luqadaha, qaybta kaalmada adeegyada, dar torres. So [facility] [phone].    ATENCIÓN: Si habla español, tiene a hughes disposición servicios gratuitos de asistencia lingüística. Llame al [phone].    We comply with applicable federal civil rights laws and [place] laws. We do not discriminate on the basis of race, color, national origin, age, disability, sex, sexual orientation, or gender identity.            Thank you!     Thank you for choosing [facility]  for your care. Our goal is always to provide you with excellent care. Hearing back from our patients is  one way we can continue to improve our services. Please take a few minutes to complete the written survey that you may receive in the mail after your visit with us. Thank you!             Your Updated Medication List - Protect others around you: Learn how to safely use, store and throw away your medicines at www.disposemymeds.org.          This list is accurate as of 18  7:41 PM.  Always use your most recent med list.                   Brand Name Dispense Instructions for use Diagnosis    acetaminophen 325 MG tablet    TYLENOL    30 tablet    Take 2 tablets (650 mg) by mouth every 4 hours as needed for other (multimodal surgical pain management along with NSAIDS and opioid medication as indicated based on pain control and physical function.)    S/P  section       amoxicillin-clavulanate 875-125 MG tablet    AUGMENTIN    20 tablet    Take 1 tablet by mouth 2 times daily    Cough, Lower resp. tract infection       ASPIRIN ADULT LOW STRENGTH PO      Take 81 mg by mouth daily        azithromycin 250 MG tablet    ZITHROMAX    6 tablet    Two tablets first day, then one tablet daily for four days.    Opacities of both lungs present on chest x-ray       DELSYM COUGH RELIEF MT           doxylamine 25 MG Tabs tablet    UNISOM     Take 25 mg by mouth At Bedtime        ferrous sulfate 325 (65 Fe) MG tablet    FEROSUL    30 tablet    Take 1 tablet (325 mg) by mouth daily (with breakfast)    Postpartum anemia       FLONASE NA           ibuprofen 600 MG tablet    ADVIL/MOTRIN    30 tablet    Take 1 tablet (600 mg) by mouth every 6 hours as needed for other (cramping)    S/P  section       oxyCODONE 5 MG tablet    ROXICODONE    24 tablet    Take 1 tablet (5 mg) by mouth every 3 hours as needed for breakthrough pain (pain control or improvement in physical function. Hold dose for analgesic side effects.)    S/P  section       Prenatal Vitamins 28-0.8 MG Tabs      1 Tab daily. chewables         senna-docusate 8.6-50 MG tablet    SENOKOT-S/PERICOLACE    40 tablet    Take 1 tablet by mouth 2 times daily    S/P  section

## 2018-12-06 NOTE — PATIENT INSTRUCTIONS
Atelectasis    The lungs  job is to get air into and out of the body. Inside the lungs, air travels through a network of branching airways (tubes) made of stretchy tissue. If something blocks these airways, or if there is too much pressure on a lung, the lung may collapse. Partial or full collapse of a lung is called atelectasis. This can cause a drop in oxygen levels. Atelectasis may be due to any of these:    Prolonged bed rest    Pattern of shallow breathing    Heavy sedation    Obesity    A foreign object in one of the air passages    A tumor (benign or malignant) that presses on an air passage    Chest or abdominal pain from surgery, injury, or other condition that may limit the ability to cough or breathe deeply  The treatment for atelectasis depends on the cause.  Home care  If your atelectasis is due to bed rest, sedation, shallow breathing, or obesity, do the following to help increase air flow in your lungs.    If you were given an incentive spirometer device, use it as directed.    If you were not given an incentive spirometer, take 4 very deep breaths every 1 to 2 hours while awake. As you exhale, purse your lips as if you were blowing up a balloon. (If possible, actually blow up a balloon or a rubber glove.)  If atelectasis was due to other causes, follow your healthcare provider s advice for home care.  Follow-up care  Follow up with your healthcare provider, or as advised.  When to seek medical advice  Call your healthcare provider right away if any of these occur:    Cough with congestion or colored sputum    Development or worsening of abdominal pain    Fever of 100.4 F (38 C) or higher, or as directed by your healthcare provider  Call 911  Call 911 if any of these occur:    Shortness of breath    Chest pain that is worse with breathing    Dizziness, weakness, or fainting    Coughing up blood   Date Last Reviewed: 6/1/2018 2000-2018 [employer]. [address] [facility], [place]  [address]. All rights reserved. This information is not intended as a substitute for professional medical care. Always follow your healthcare professional's instructions.

## 2018-12-06 NOTE — PROGRESS NOTES
SUBJECTIVE:   Keysha Dixon Caro is a 28 year old female presenting with a chief complaint of   Chief Complaint   Patient presents with     Cough     x1 month with wheezing Chest pain on left side.       She is an established patient of [facility].    URI Adult    Onset of symptoms was 1 month(s) ago.  Course of illness is worsening.    Severity moderate  Current and Associated symptoms: cough - productive and chest tenderness on the left  Treatment measures tried include OTC Cough med.  Predisposing factors include None.      Review of Systems   Constitutional: Negative for chills and fever.   HENT: Negative for congestion, ear pain, rhinorrhea and sore throat.    Respiratory: Positive for cough. Negative for shortness of breath.         Chest tender on left when she coughs   Gastrointestinal: Negative for diarrhea, nausea and vomiting.   Neurological: Negative for headaches.   All other systems reviewed and are negative.      Past Medical History:   Diagnosis Date     Cardiac abnormality      Chronic kidney disease      Hypertension      NO ACTIVE PROBLEMS      Pneumonia 2012     Uncomplicated asthma     no MDI     History reviewed. No pertinent family history.  Current Outpatient Prescriptions   Medication Sig Dispense Refill     azithromycin (ZITHROMAX) 250 MG tablet Two tablets first day, then one tablet daily for four days. 6 tablet 0     benzonatate (TESSALON) 200 MG capsule Take 1 capsule (200 mg) by mouth 3 times daily as needed for cough 21 capsule 0     acetaminophen (TYLENOL) 325 MG tablet Take 2 tablets (650 mg) by mouth every 4 hours as needed for other (multimodal surgical pain management along with NSAIDS and opioid medication as indicated based on pain control and physical function.) (Patient not taking: Reported on 12/5/2018) 30 tablet 0     amoxicillin-clavulanate (AUGMENTIN) 875-125 MG per tablet Take 1 tablet by mouth 2 times daily (Patient not taking: Reported on 12/5/2018) 20 tablet 0     ASPIRIN  ADULT LOW STRENGTH PO Take 81 mg by mouth daily       Dextromethorphan-Menthol (DELSYM COUGH RELIEF MT)        doxylamine (UNISOM) 25 MG TABS tablet Take 25 mg by mouth At Bedtime       ferrous sulfate (IRON) 325 (65 Fe) MG tablet Take 1 tablet (325 mg) by mouth daily (with breakfast) (Patient not taking: Reported on 11/2/2018) 30 tablet 2     Fluticasone Propionate (FLONASE NA)        ibuprofen (ADVIL/MOTRIN) 600 MG tablet Take 1 tablet (600 mg) by mouth every 6 hours as needed for other (cramping) (Patient not taking: Reported on 11/2/2018) 30 tablet 0     oxyCODONE IR (ROXICODONE) 5 MG tablet Take 1 tablet (5 mg) by mouth every 3 hours as needed for breakthrough pain (pain control or improvement in physical function. Hold dose for analgesic side effects.) (Patient not taking: Reported on 11/2/2018) 24 tablet 0     Prenatal Vit-Fe Fumarate-FA (PRENATAL VITAMINS) 28-0.8 MG TABS 1 Tab daily. chewables       senna-docusate (SENOKOT-S;PERICOLACE) 8.6-50 MG per tablet Take 1 tablet by mouth 2 times daily (Patient not taking: Reported on 11/2/2018) 40 tablet 0     Social History   Substance Use Topics     Smoking status: Never Smoker     Smokeless tobacco: Never Used     Alcohol use No      Comment: [employer]       OBJECTIVE  /80  Pulse 124  Temp 99.3  F (37.4  C) (Oral)  Resp 20  Wt 287 lb 3.2 oz (130.3 kg)  SpO2 99%  BMI 54.27 kg/m2    Physical Exam   HENT:   Right Ear: Tympanic membrane, external ear and ear canal normal.   Left Ear: Tympanic membrane, external ear and ear canal normal.   Nose: Mucosal edema and rhinorrhea present.   Mouth/Throat: Uvula is midline, oropharynx is clear and moist and mucous membranes are normal.   Cardiovascular: Normal rate, S1 normal, S2 normal and normal heart sounds.    Pulmonary/Chest: Effort normal and breath sounds normal.         ASSESSMENT:      ICD-10-CM    1. Cough, persistent R05 XR Chest 2 Views     benzonatate (TESSALON) 200 MG capsule   2. Opacities of both lungs  present on chest x-ray R91.8 azithromycin (ZITHROMAX) 250 MG tablet        Differential Diagnosis:  URI Adult/Peds:  Pneumonia    Serious Comorbid Conditions:  Adult:  None    PLAN:  I discussed xray results with the patient. I will put her on z-pack and have her follow up with PCP.  Patient educational/instructional material provided including reasons for follow-up    The patient indicates understanding of these issues and agrees with the plan.            Patient Instructions       Atelectasis    The lungs  job is to get air into and out of the body. Inside the lungs, air travels through a network of branching airways (tubes) made of stretchy tissue. If something blocks these airways, or if there is too much pressure on a lung, the lung may collapse. Partial or full collapse of a lung is called atelectasis. This can cause a drop in oxygen levels. Atelectasis may be due to any of these:    Prolonged bed rest    Pattern of shallow breathing    Heavy sedation    Obesity    A foreign object in one of the air passages    A tumor (benign or malignant) that presses on an air passage    Chest or abdominal pain from surgery, injury, or other condition that may limit the ability to cough or breathe deeply  The treatment for atelectasis depends on the cause.  Home care  If your atelectasis is due to bed rest, sedation, shallow breathing, or obesity, do the following to help increase air flow in your lungs.    If you were given an incentive spirometer device, use it as directed.    If you were not given an incentive spirometer, take 4 very deep breaths every 1 to 2 hours while awake. As you exhale, purse your lips as if you were blowing up a balloon. (If possible, actually blow up a balloon or a rubber glove.)  If atelectasis was due to other causes, follow your healthcare provider s advice for home care.  Follow-up care  Follow up with your healthcare provider, or as advised.  When to seek medical advice  Call your healthcare  provider right away if any of these occur:    Cough with congestion or colored sputum    Development or worsening of abdominal pain    Fever of 100.4 F (38 C) or higher, or as directed by your healthcare provider  Call 911  Call 911 if any of these occur:    Shortness of breath    Chest pain that is worse with breathing    Dizziness, weakness, or fainting    Coughing up blood   Date Last Reviewed: 6/1/2018 2000-2018 [employer]. [address] [place] [address], [place] [address]. All rights reserved. This information is not intended as a substitute for professional medical care. Always follow your healthcare professional's instructions.

## 2019-05-21 ENCOUNTER — OFFICE VISIT (OUTPATIENT)
Dept: URGENT CARE | Facility: URGENT CARE | Age: 29
End: 2019-05-21
Payer: COMMERCIAL

## 2019-05-21 VITALS
TEMPERATURE: 100.2 F | HEART RATE: 121 BPM | SYSTOLIC BLOOD PRESSURE: 135 MMHG | DIASTOLIC BLOOD PRESSURE: 86 MMHG | BODY MASS INDEX: 58.04 KG/M2 | RESPIRATION RATE: 22 BRPM | WEIGHT: 293 LBS | OXYGEN SATURATION: 98 %

## 2019-05-21 DIAGNOSIS — J06.9 VIRAL UPPER RESPIRATORY TRACT INFECTION: ICD-10-CM

## 2019-05-21 DIAGNOSIS — H66.001 ACUTE SUPPURATIVE OTITIS MEDIA OF RIGHT EAR WITHOUT SPONTANEOUS RUPTURE OF TYMPANIC MEMBRANE, RECURRENCE NOT SPECIFIED: Primary | ICD-10-CM

## 2019-05-21 PROCEDURE — 99214 OFFICE O/P EST MOD 30 MIN: CPT | Performed by: NURSE PRACTITIONER

## 2019-05-21 RX ORDER — AMOXICILLIN 500 MG/1
500 CAPSULE ORAL 2 TIMES DAILY
Qty: 14 CAPSULE | Refills: 0 | Status: SHIPPED | OUTPATIENT
Start: 2019-05-21 | End: 2019-08-02

## 2019-05-21 ASSESSMENT — ENCOUNTER SYMPTOMS
RHINORRHEA: 0
SHORTNESS OF BREATH: 0
NAUSEA: 0
COUGH: 1
SORE THROAT: 1
FEVER: 1
HEADACHES: 0
CHILLS: 0
VOMITING: 0
DIARRHEA: 0

## 2019-05-21 NOTE — PROGRESS NOTES
SUBJECTIVE:   Keysha Dixon Caro is a 28 year old female presenting with a chief complaint of   Chief Complaint   Patient presents with     Pharyngitis     Started yesterday     Ear Problem     Right ear pain     Fever     Started last night      Cough     Dry cough started today        She is an established patient of [facility].    URI Adult    Onset of symptoms was 1 day(s) ago.  Course of illness is worsening.    Severity moderate  Current and Associated symptoms: fever, cough - non-productive, ear pain right and sore throat  Treatment measures tried include Tylenol/Ibuprofen.  Predisposing factors include ill contact: Family member, history of asthma .      Review of Systems   Constitutional: Positive for fever. Negative for chills.   HENT: Positive for ear pain and sore throat. Negative for congestion and rhinorrhea.    Respiratory: Positive for cough. Negative for shortness of breath.    Gastrointestinal: Negative for diarrhea, nausea and vomiting.   Neurological: Negative for headaches.   All other systems reviewed and are negative.      Past Medical History:   Diagnosis Date     Cardiac abnormality      Chronic kidney disease      Hypertension      NO ACTIVE PROBLEMS      Pneumonia 2012     Uncomplicated asthma     no MDI     History reviewed. No pertinent family history.  Current Outpatient Medications   Medication Sig Dispense Refill     amoxicillin (AMOXIL) 500 MG capsule Take 1 capsule (500 mg) by mouth 2 times daily for 7 days 14 capsule 0     acetaminophen (TYLENOL) 325 MG tablet Take 2 tablets (650 mg) by mouth every 4 hours as needed for other (multimodal surgical pain management along with NSAIDS and opioid medication as indicated based on pain control and physical function.) (Patient not taking: Reported on 12/5/2018) 30 tablet 0     amoxicillin-clavulanate (AUGMENTIN) 875-125 MG per tablet Take 1 tablet by mouth 2 times daily (Patient not taking: Reported on 12/5/2018) 20 tablet 0     ASPIRIN ADULT  LOW STRENGTH PO Take 81 mg by mouth daily       azithromycin (ZITHROMAX) 250 MG tablet Two tablets first day, then one tablet daily for four days. (Patient not taking: Reported on 5/21/2019) 6 tablet 0     Dextromethorphan-Menthol (DELSYM COUGH RELIEF MT)        doxylamine (UNISOM) 25 MG TABS tablet Take 25 mg by mouth At Bedtime       ferrous sulfate (IRON) 325 (65 Fe) MG tablet Take 1 tablet (325 mg) by mouth daily (with breakfast) (Patient not taking: Reported on 11/2/2018) 30 tablet 2     Fluticasone Propionate (FLONASE NA)        ibuprofen (ADVIL/MOTRIN) 600 MG tablet Take 1 tablet (600 mg) by mouth every 6 hours as needed for other (cramping) (Patient not taking: Reported on 11/2/2018) 30 tablet 0     oxyCODONE IR (ROXICODONE) 5 MG tablet Take 1 tablet (5 mg) by mouth every 3 hours as needed for breakthrough pain (pain control or improvement in physical function. Hold dose for analgesic side effects.) (Patient not taking: Reported on 11/2/2018) 24 tablet 0     Prenatal Vit-Fe Fumarate-FA (PRENATAL VITAMINS) 28-0.8 MG TABS 1 Tab daily. chewables       senna-docusate (SENOKOT-S;PERICOLACE) 8.6-50 MG per tablet Take 1 tablet by mouth 2 times daily (Patient not taking: Reported on 11/2/2018) 40 tablet 0     Social History     Tobacco Use     Smoking status: Never Smoker     Smokeless tobacco: Never Used   Substance Use Topics     Alcohol use: No     Comment: [employer]       OBJECTIVE  /86   Pulse 121   Temp 100.2  F (37.9  C) (Oral)   Resp 22   Wt 139.3 kg (307 lb 3.2 oz)   SpO2 98%   BMI 58.04 kg/m      Physical Exam   Constitutional: No distress.   HENT:   Head: Normocephalic and atraumatic.   Right Ear: External ear normal. Tympanic membrane is erythematous (suppurative) and bulging.   Left Ear: Tympanic membrane and external ear normal.   Nose: Mucosal edema and rhinorrhea present.   Mouth/Throat: Posterior oropharyngeal erythema present.   Eyes: Pupils are equal, round, and reactive to light. EOM are  normal.   Neck: Normal range of motion. Neck supple.   Pulmonary/Chest: Effort normal and breath sounds normal. No respiratory distress.   Lymphadenopathy:     She has no cervical adenopathy.   Neurological: She is alert. No cranial nerve deficit.   Skin: Skin is warm and dry. She is not diaphoretic.   Psychiatric: She has a normal mood and affect.   Nursing note and vitals reviewed.      ASSESSMENT:      ICD-10-CM    1. Acute suppurative otitis media of right ear without spontaneous rupture of tympanic membrane, recurrence not specified H66.001 amoxicillin (AMOXIL) 500 MG capsule   2. Viral upper respiratory tract infection J06.9           PLAN:  I have discussed clinical findings with patient.  Side effects of medications discussed.  Symptomatic care is discussed.  I have discussed the possibility of  worsening symptoms and to RTC or ER if they occur.  All questions are answered and patient is in agreement with plan.   Patient care instructions are given to at the end of visit.        Patient Instructions     Patient Education     Middle Ear Infection (Adult)  You have an infection of the middle ear, the space behind the eardrum. This is also called acute otitis media (AOM). Sometimes it is caused by the common cold. This is because congestion can block the internal passage (eustachian tube) that drains fluid from the middle ear. When the middle ear fills with fluid, bacteria can grow there and cause an infection. Oral antibiotics are used to treat this illness, not ear drops. Symptoms usually start to improve within 1 to 2 days of treatment.    Home care  The following are general care guidelines:    Finish all of the antibiotic medicine given, even though you may feel better after the first few days.    You may use over-the-counter medicine, such as acetaminophen or ibuprofen, to control pain and fever, unless something else was prescribed. If you have chronic liver or kidney disease or have ever had a stomach  ulcer or gastrointestinal bleeding, talk with your healthcare provider before using these medicines. Do not give aspirin to anyone under 18 years of age who has a fever. It may cause severe illness or death.  Follow-up care  Follow up with your healthcare provider, or as advised, in 2 weeks if all symptoms have not gotten better, or if hearing doesn't go back to normal within 1 month.  When to seek medical advice  Call your healthcare provider right away if any of these occur:    Ear pain gets worse or does not improve after 3 days of treatment    Unusual drowsiness or confusion    Neck pain, stiff neck, or headache    Fluid or blood draining from the ear canal    Fever of 100.4 F (38 C) or as advised     Seizure  Date Last Reviewed: 6/1/2016 2000-2018 [employer]. [address] [place] [address], [place] [address]. All rights reserved. This information is not intended as a substitute for professional medical care. Always follow your healthcare professional's instructions.

## 2019-05-21 NOTE — PATIENT INSTRUCTIONS
Patient Education     Middle Ear Infection (Adult)  You have an infection of the middle ear, the space behind the eardrum. This is also called acute otitis media (AOM). Sometimes it is caused by the common cold. This is because congestion can block the internal passage (eustachian tube) that drains fluid from the middle ear. When the middle ear fills with fluid, bacteria can grow there and cause an infection. Oral antibiotics are used to treat this illness, not ear drops. Symptoms usually start to improve within 1 to 2 days of treatment.    Home care  The following are general care guidelines:    Finish all of the antibiotic medicine given, even though you may feel better after the first few days.    You may use over-the-counter medicine, such as acetaminophen or ibuprofen, to control pain and fever, unless something else was prescribed. If you have chronic liver or kidney disease or have ever had a stomach ulcer or gastrointestinal bleeding, talk with your healthcare provider before using these medicines. Do not give aspirin to anyone under 18 years of age who has a fever. It may cause severe illness or death.  Follow-up care  Follow up with your healthcare provider, or as advised, in 2 weeks if all symptoms have not gotten better, or if hearing doesn't go back to normal within 1 month.  When to seek medical advice  Call your healthcare provider right away if any of these occur:    Ear pain gets worse or does not improve after 3 days of treatment    Unusual drowsiness or confusion    Neck pain, stiff neck, or headache    Fluid or blood draining from the ear canal    Fever of 100.4 F (38 C) or as advised     Seizure  Date Last Reviewed: 6/1/2016 2000-2018 [employer]. [address] [place] [address], [place] [address]. All rights reserved. This information is not intended as a substitute for professional medical care. Always follow your healthcare professional's instructions.

## 2019-08-02 ENCOUNTER — OFFICE VISIT (OUTPATIENT)
Dept: MIDWIFE SERVICES | Facility: CLINIC | Age: 29
End: 2019-08-02
Payer: COMMERCIAL

## 2019-08-02 VITALS
WEIGHT: 293 LBS | HEART RATE: 96 BPM | DIASTOLIC BLOOD PRESSURE: 96 MMHG | BODY MASS INDEX: 55.32 KG/M2 | OXYGEN SATURATION: 97 % | SYSTOLIC BLOOD PRESSURE: 146 MMHG | HEIGHT: 61 IN

## 2019-08-02 DIAGNOSIS — Z30.430 ENCOUNTER FOR IUD INSERTION: ICD-10-CM

## 2019-08-02 LAB — HCG UR QL: NEGATIVE

## 2019-08-02 PROCEDURE — 58300 INSERT INTRAUTERINE DEVICE: CPT | Performed by: ADVANCED PRACTICE MIDWIFE

## 2019-08-02 PROCEDURE — 81025 URINE PREGNANCY TEST: CPT | Performed by: ADVANCED PRACTICE MIDWIFE

## 2019-08-02 ASSESSMENT — MIFFLIN-ST. JEOR: SCORE: 2030.87

## 2019-08-02 NOTE — PROGRESS NOTES
INDICATIONS:                                                      Is a pregnancy test required: Yes.  Was it positive or negative?  Negative  Was a consent obtained?  Yes    Keysha Dixon Caro is a 28 year old female,   who presents for insertion of an IUD. The risks, benefits and alternatives of IUD insertion were discussed in detail previously. She also has reviewed the product brochure.  She has elected to go ahead with the insertion  today and her questions were answered. Consent was signed. Her LMP began yesterday and was normal in duration and amount of flow. A pregnancy test was performed today:  Yes    Today's PHQ-2 Score:   PHQ-2 (  [employer]) 2017   Q1: Little interest or pleasure in doing things 0   Q2: Feeling down, depressed or hopeless 0   PHQ-2 Score 0       PROCEDURE:                                                      The pelvic exam revealed normal external genitalia. On bimanual exam the uterus was Anteverted and Midposition and normal in size with no tenderness present. A speculum was inserted into the vagina and the cervix was visualized. The cervix was prepped with Betadine . The anterior lip of the cervix was grasped with a single toothed tenaculum. The uterus sounded to 7 cm. A Mirena IUD was then inserted without difficulty. The string was cut to 3 cm.  The patient experienced a mild amount of cramping.    POST PROCEDURE:                                                      She  tolerated the procedure well. There were no complications. Patient was discharged in stable condition.    Return to clinic in 2-5 weeks for IUD check.  Call if severe cramping, fever, abnormal bleeding, abnormal discharge or pelvic pain develop.  Patient was counseled about the chance of irregular bleeding.    Wendy Best CNM

## 2019-08-02 NOTE — PROGRESS NOTES
"Chief Complaint   Patient presents with     IUD     Consult       Initial BP (!) 142/96 (BP Location: Left arm, Patient Position: Sitting, Cuff Size: Adult Regular)   Pulse 96   Ht 1.549 m (5' 1\")   Wt 137.3 kg (302 lb 12.8 oz)   LMP 2019   SpO2 97%   Breastfeeding? No   BMI 57.21 kg/m   Estimated body mass index is 57.21 kg/m  as calculated from the following:    Height as of this encounter: 1.549 m (5' 1\").    Weight as of this encounter: 137.3 kg (302 lb 12.8 oz).  BP completed using cuff size: regular, on forearm    Questioned patient about current smoking habits.  Pt. has never smoked.          The following HM Due: NONE      The following patient reported/Care Every where data was sent to:  P ABSTRACT QUALITY INITIATIVES [25586]  n/a      n/a and patient has appointment for today              " Bedside and Verbal shift change report given to Xin Kaur by Efraín Guevara RN. Report included the following information SBAR, Kardex, Intake/Output, MAR, Recent Results and Cardiac Rhythm NSR/ paced.

## 2019-08-02 NOTE — PATIENT INSTRUCTIONS

## 2019-12-03 NOTE — PLAN OF CARE
Problem:  Delivery (Adult,Obstetrics,Pediatric)  Goal: Signs and Symptoms of Listed Potential Problems Will be Absent, Minimized or Managed ( Delivery)  Signs and symptoms of listed potential problems will be absent, minimized or managed by discharge/transition of care (reference  Delivery (Adult,Obstetrics,Pediatric) CPG).   Outcome: Improving  Data: Vital signs within normal limits. Postpartum checks within normal limits - see flow record. Patient eating and drinking normally. Patient able to empty bladder independently and is up ambulating. No apparent signs of infection. Incision healing well. Patient performing self cares and is able to care for infant.  Action: Patient medicated during the shift for pain. See MAR. Patient reassessed within 1 hour after each medication and pain was improved - patient stated she was comfortable. Staples removed and steri strips applied byPark Macias MD. Patient discharge education done and she attended DC class. Patient and spouse deny further questions.  Response: Positive attachment behaviors observed with infant. Support person (FOB) present. Discharged home at 1420.       details No chest wall deformities/Symmetric breath sounds clear to auscultation and percussion/Normal respiratory pattern

## 2020-01-04 ENCOUNTER — OFFICE VISIT (OUTPATIENT)
Dept: URGENT CARE | Facility: URGENT CARE | Age: 30
End: 2020-01-04
Payer: COMMERCIAL

## 2020-01-04 VITALS
SYSTOLIC BLOOD PRESSURE: 132 MMHG | RESPIRATION RATE: 28 BRPM | TEMPERATURE: 101.9 F | BODY MASS INDEX: 58.05 KG/M2 | WEIGHT: 293 LBS | HEART RATE: 129 BPM | DIASTOLIC BLOOD PRESSURE: 85 MMHG | OXYGEN SATURATION: 96 %

## 2020-01-04 DIAGNOSIS — J11.1 INFLUENZA: Primary | ICD-10-CM

## 2020-01-04 PROCEDURE — 99213 OFFICE O/P EST LOW 20 MIN: CPT | Performed by: FAMILY MEDICINE

## 2020-01-04 RX ORDER — OSELTAMIVIR PHOSPHATE 75 MG/1
75 CAPSULE ORAL 2 TIMES DAILY
Qty: 10 CAPSULE | Refills: 0 | Status: SHIPPED | OUTPATIENT
Start: 2020-01-04 | End: 2020-01-09

## 2020-01-04 NOTE — PROGRESS NOTES
SUBJECTIVE:   Keysha Dixon Caro is a 29 year old female presenting with a chief complaint of   Chief Complaint   Patient presents with     Urgent Care     URI     Since this past Thursday, cough, fever/chills, bodyaches, runny nose, sore throat, SOB, when she coughs it is hard to catch her breath, highest temp 103.7 in the middle of the night last night       She is an established patient of [facility].    29-year-old female presenting with fever chills cough achy back and joints over the past 48 hours.    Past history   significant for pneumonia mostly as a child but is much as 10 times over her lifetime.  Kidney stones during her pregnancy in 2018.    She did not have a flu vaccine this past season although tends to get the annual vaccination.      Review of Systems  Per hpi   Past Medical History:   Diagnosis Date     Cardiac abnormality      Chronic kidney disease      Hypertension      NO ACTIVE PROBLEMS      Pneumonia 2012     Uncomplicated asthma     no MDI     No family history on file.  Current Outpatient Medications   Medication Sig Dispense Refill     acetaminophen (TYLENOL) 325 MG tablet Take 2 tablets (650 mg) by mouth every 4 hours as needed for other (multimodal surgical pain management along with NSAIDS and opioid medication as indicated based on pain control and physical function.) 30 tablet 0     Social History     Tobacco Use     Smoking status: Never Smoker     Smokeless tobacco: Never Used   Substance Use Topics     Alcohol use: No     Comment: [place]       OBJECTIVE  /85 (Patient Position: Chair, Cuff Size: Adult Regular)   Pulse 129   Temp 101.9  F (38.8  C) (Oral)   Resp 28   Wt 139.4 kg (307 lb 4 oz)   SpO2 96%   Breastfeeding No   BMI 58.05 kg/m      Physical Exam  HENT:      Head: Normocephalic and atraumatic.      Right Ear: External ear normal.      Left Ear: External ear normal.      Nose: Nose normal.      Mouth/Throat:      Pharynx: No oropharyngeal exudate.   Eyes:       General: No scleral icterus.        Right eye: No discharge.         Left eye: No discharge.      Conjunctiva/sclera: Conjunctivae normal.      Pupils: Pupils are equal, round, and reactive to light.   Neck:      Musculoskeletal: Neck supple.      Thyroid: No thyromegaly.      Trachea: No tracheal deviation.   Cardiovascular:      Rate and Rhythm: Normal rate and regular rhythm.      Heart sounds: Normal heart sounds. No murmur. No friction rub. No gallop.    Pulmonary:      Effort: Pulmonary effort is normal. No respiratory distress.      Breath sounds: Normal breath sounds. No stridor. No wheezing or rales.   Chest:      Chest wall: No tenderness.   Abdominal:      General: Bowel sounds are normal. There is no distension.      Palpations: Abdomen is soft. There is no mass.      Tenderness: There is no abdominal tenderness. There is no guarding or rebound.   Musculoskeletal:         General: No tenderness or deformity.   Lymphadenopathy:      Cervical: No cervical adenopathy.   Skin:     General: Skin is warm and dry.      Findings: No erythema or rash.   Neurological:      Mental Status: She is alert and oriented to person, place, and time.      Cranial Nerves: No cranial nerve deficit.   Psychiatric:         Judgment: Judgment normal.         ASSESSMENT:    ICD-10-CM    1. Influenza J11.1 oseltamivir (TAMIFLU) 75 MG capsule        PLAN  Rest, fluids and normal time to resolution described   Patient educational/instructional material provided including reasons for follow-up   The patient indicates understanding of these issues and agrees with the plan.   Jhonny Bennett MD

## 2020-01-04 NOTE — LETTER
[facility]  [address]  [facility] [address]  Phone: [phone]    January 4, 2020        Keysha Dixon Caro  [address] [place] [address]          To whom it may concern:    RE: Keysha Dixon Caro    Patient was seen and treated today at our clinic.  Keysha was seen today in the clinic having influenza symptoms over the past 2 days.  I would recommend that she stay home today and rest and likely tomorrow as well.  She may return to work the following day on 1/6/20 if feeling well. Follow-up if symptoms persist or worsen.        Sincerely,        Jhonny Bennett MD

## 2020-03-01 ENCOUNTER — HEALTH MAINTENANCE LETTER (OUTPATIENT)
Age: 30
End: 2020-03-01

## 2020-12-14 ENCOUNTER — HEALTH MAINTENANCE LETTER (OUTPATIENT)
Age: 30
End: 2020-12-14

## 2021-02-27 ENCOUNTER — HEALTH MAINTENANCE LETTER (OUTPATIENT)
Age: 31
End: 2021-02-27

## 2021-04-17 ENCOUNTER — HEALTH MAINTENANCE LETTER (OUTPATIENT)
Age: 31
End: 2021-04-17

## 2021-10-02 ENCOUNTER — HEALTH MAINTENANCE LETTER (OUTPATIENT)
Age: 31
End: 2021-10-02

## 2022-05-14 ENCOUNTER — HEALTH MAINTENANCE LETTER (OUTPATIENT)
Age: 32
End: 2022-05-14

## 2023-01-14 ENCOUNTER — HEALTH MAINTENANCE LETTER (OUTPATIENT)
Age: 33
End: 2023-01-14

## 2023-06-02 ENCOUNTER — HEALTH MAINTENANCE LETTER (OUTPATIENT)
Age: 33
End: 2023-06-02

## (undated) DEVICE — GLOVE SENSICARE PI POWDER FREE 7.5 LATEX FREE MSG9075

## (undated) DEVICE — ESU GROUND PAD UNIVERSAL W/O CORD

## (undated) DEVICE — SU VICRYL 0 CT-1 36" J346H

## (undated) DEVICE — BARRIER SEPRAFILM 5X6" SINGLE SHEET 4301-02

## (undated) DEVICE — GLOVE ESTEEM POWDER FREE SMT 7.0  2D72PT70

## (undated) DEVICE — STRAP KNEE/BODY 31143004

## (undated) DEVICE — SUCTION CANISTER MEDIVAC LINER 1500ML W/LID 65651-515

## (undated) DEVICE — STOCKING SLEEVE COMPRESSION CALF LG

## (undated) DEVICE — STPL SKIN 35W 059037

## (undated) DEVICE — PREP CHLORAPREP 26ML TINTED ORANGE  260815

## (undated) DEVICE — RETR PANNICULUS 16X24 7/8" 25-G5 PH -25

## (undated) DEVICE — SOL WATER IRRIG 1000ML BOTTLE 07139-09

## (undated) DEVICE — CATH TRAY FOLEY 16FR SILICONE 907416

## (undated) DEVICE — BNDG ABDOMINAL BINDER 10X26-50" 08140145

## (undated) DEVICE — SOL NACL 0.9% IRRIG 1000ML BOTTLE 07138-09

## (undated) DEVICE — SPONGE LAP 18X18" 1515

## (undated) DEVICE — BASIN SET MAJOR

## (undated) DEVICE — PACK C-SECTION LF PL15OTA83B

## (undated) DEVICE — DRSG ADAPTIC 3X8" 6113

## (undated) DEVICE — SU MONOCRYL 0 CT-1 36" Y346H

## (undated) RX ORDER — FENTANYL CITRATE 50 UG/ML
INJECTION, SOLUTION INTRAMUSCULAR; INTRAVENOUS
Status: DISPENSED
Start: 2018-06-22

## (undated) RX ORDER — HYDROMORPHONE HCL/0.9% NACL/PF 0.2MG/0.2
SYRINGE (ML) INTRAVENOUS
Status: DISPENSED
Start: 2018-06-22

## (undated) RX ORDER — ONDANSETRON 2 MG/ML
INJECTION INTRAMUSCULAR; INTRAVENOUS
Status: DISPENSED
Start: 2018-06-22

## (undated) RX ORDER — OXYTOCIN/0.9 % SODIUM CHLORIDE 30/500 ML
PLASTIC BAG, INJECTION (ML) INTRAVENOUS
Status: DISPENSED
Start: 2018-06-22